# Patient Record
Sex: FEMALE | Race: OTHER | HISPANIC OR LATINO | URBAN - METROPOLITAN AREA
[De-identification: names, ages, dates, MRNs, and addresses within clinical notes are randomized per-mention and may not be internally consistent; named-entity substitution may affect disease eponyms.]

---

## 2021-06-09 ENCOUNTER — EMERGENCY (EMERGENCY)
Facility: HOSPITAL | Age: 41
LOS: 1 days | Discharge: HOME | End: 2021-06-09
Attending: EMERGENCY MEDICINE
Payer: MEDICAID

## 2021-06-09 VITALS
DIASTOLIC BLOOD PRESSURE: 88 MMHG | WEIGHT: 186.95 LBS | HEIGHT: 62 IN | SYSTOLIC BLOOD PRESSURE: 136 MMHG | RESPIRATION RATE: 16 BRPM | HEART RATE: 92 BPM | TEMPERATURE: 97 F | OXYGEN SATURATION: 99 %

## 2021-06-09 VITALS
HEART RATE: 80 BPM | SYSTOLIC BLOOD PRESSURE: 122 MMHG | TEMPERATURE: 98 F | DIASTOLIC BLOOD PRESSURE: 77 MMHG | RESPIRATION RATE: 17 BRPM | OXYGEN SATURATION: 99 %

## 2021-06-09 LAB
ALBUMIN SERPL ELPH-MCNC: 3.5 G/DL — SIGNIFICANT CHANGE UP (ref 3.5–5)
ALP SERPL-CCNC: 71 U/L — SIGNIFICANT CHANGE UP (ref 40–120)
ALT FLD-CCNC: 18 U/L DA — SIGNIFICANT CHANGE UP (ref 10–60)
ANION GAP SERPL CALC-SCNC: 5 MMOL/L — SIGNIFICANT CHANGE UP (ref 5–17)
APPEARANCE UR: CLEAR — SIGNIFICANT CHANGE UP
AST SERPL-CCNC: 14 U/L — SIGNIFICANT CHANGE UP (ref 10–40)
BASOPHILS # BLD AUTO: 0.04 K/UL — SIGNIFICANT CHANGE UP (ref 0–0.2)
BASOPHILS NFR BLD AUTO: 0.3 % — SIGNIFICANT CHANGE UP (ref 0–2)
BILIRUB SERPL-MCNC: 0.3 MG/DL — SIGNIFICANT CHANGE UP (ref 0.2–1.2)
BILIRUB UR-MCNC: NEGATIVE — SIGNIFICANT CHANGE UP
BUN SERPL-MCNC: 13 MG/DL — SIGNIFICANT CHANGE UP (ref 7–18)
CALCIUM SERPL-MCNC: 8.9 MG/DL — SIGNIFICANT CHANGE UP (ref 8.4–10.5)
CHLORIDE SERPL-SCNC: 108 MMOL/L — SIGNIFICANT CHANGE UP (ref 96–108)
CO2 SERPL-SCNC: 26 MMOL/L — SIGNIFICANT CHANGE UP (ref 22–31)
COLOR SPEC: YELLOW — SIGNIFICANT CHANGE UP
CREAT SERPL-MCNC: 0.66 MG/DL — SIGNIFICANT CHANGE UP (ref 0.5–1.3)
DIFF PNL FLD: ABNORMAL
EOSINOPHIL # BLD AUTO: 0.2 K/UL — SIGNIFICANT CHANGE UP (ref 0–0.5)
EOSINOPHIL NFR BLD AUTO: 1.5 % — SIGNIFICANT CHANGE UP (ref 0–6)
GLUCOSE SERPL-MCNC: 86 MG/DL — SIGNIFICANT CHANGE UP (ref 70–99)
GLUCOSE UR QL: NEGATIVE — SIGNIFICANT CHANGE UP
HCG SERPL-ACNC: <1 MIU/ML — SIGNIFICANT CHANGE UP
HCT VFR BLD CALC: 42.6 % — SIGNIFICANT CHANGE UP (ref 34.5–45)
HGB BLD-MCNC: 13.7 G/DL — SIGNIFICANT CHANGE UP (ref 11.5–15.5)
IMM GRANULOCYTES NFR BLD AUTO: 0.5 % — SIGNIFICANT CHANGE UP (ref 0–1.5)
KETONES UR-MCNC: NEGATIVE — SIGNIFICANT CHANGE UP
LEUKOCYTE ESTERASE UR-ACNC: ABNORMAL
LIDOCAIN IGE QN: 130 U/L — SIGNIFICANT CHANGE UP (ref 73–393)
LYMPHOCYTES # BLD AUTO: 24.1 % — SIGNIFICANT CHANGE UP (ref 13–44)
LYMPHOCYTES # BLD AUTO: 3.19 K/UL — SIGNIFICANT CHANGE UP (ref 1–3.3)
MCHC RBC-ENTMCNC: 28.1 PG — SIGNIFICANT CHANGE UP (ref 27–34)
MCHC RBC-ENTMCNC: 32.2 GM/DL — SIGNIFICANT CHANGE UP (ref 32–36)
MCV RBC AUTO: 87.5 FL — SIGNIFICANT CHANGE UP (ref 80–100)
MONOCYTES # BLD AUTO: 0.81 K/UL — SIGNIFICANT CHANGE UP (ref 0–0.9)
MONOCYTES NFR BLD AUTO: 6.1 % — SIGNIFICANT CHANGE UP (ref 2–14)
NEUTROPHILS # BLD AUTO: 8.93 K/UL — HIGH (ref 1.8–7.4)
NEUTROPHILS NFR BLD AUTO: 67.5 % — SIGNIFICANT CHANGE UP (ref 43–77)
NITRITE UR-MCNC: NEGATIVE — SIGNIFICANT CHANGE UP
NRBC # BLD: 0 /100 WBCS — SIGNIFICANT CHANGE UP (ref 0–0)
PH UR: 7 — SIGNIFICANT CHANGE UP (ref 5–8)
PLATELET # BLD AUTO: 261 K/UL — SIGNIFICANT CHANGE UP (ref 150–400)
POTASSIUM SERPL-MCNC: 3.9 MMOL/L — SIGNIFICANT CHANGE UP (ref 3.5–5.3)
POTASSIUM SERPL-SCNC: 3.9 MMOL/L — SIGNIFICANT CHANGE UP (ref 3.5–5.3)
PROT SERPL-MCNC: 7.8 G/DL — SIGNIFICANT CHANGE UP (ref 6–8.3)
PROT UR-MCNC: NEGATIVE — SIGNIFICANT CHANGE UP
RBC # BLD: 4.87 M/UL — SIGNIFICANT CHANGE UP (ref 3.8–5.2)
RBC # FLD: 13.9 % — SIGNIFICANT CHANGE UP (ref 10.3–14.5)
SODIUM SERPL-SCNC: 139 MMOL/L — SIGNIFICANT CHANGE UP (ref 135–145)
SP GR SPEC: 1 — LOW (ref 1.01–1.02)
UROBILINOGEN FLD QL: NEGATIVE — SIGNIFICANT CHANGE UP
WBC # BLD: 13.24 K/UL — HIGH (ref 3.8–10.5)
WBC # FLD AUTO: 13.24 K/UL — HIGH (ref 3.8–10.5)

## 2021-06-09 PROCEDURE — 85025 COMPLETE CBC W/AUTO DIFF WBC: CPT

## 2021-06-09 PROCEDURE — 83690 ASSAY OF LIPASE: CPT

## 2021-06-09 PROCEDURE — 87086 URINE CULTURE/COLONY COUNT: CPT

## 2021-06-09 PROCEDURE — 81001 URINALYSIS AUTO W/SCOPE: CPT

## 2021-06-09 PROCEDURE — 93005 ELECTROCARDIOGRAM TRACING: CPT

## 2021-06-09 PROCEDURE — 84702 CHORIONIC GONADOTROPIN TEST: CPT

## 2021-06-09 PROCEDURE — 36415 COLL VENOUS BLD VENIPUNCTURE: CPT

## 2021-06-09 PROCEDURE — 99284 EMERGENCY DEPT VISIT MOD MDM: CPT | Mod: 25

## 2021-06-09 PROCEDURE — 96374 THER/PROPH/DIAG INJ IV PUSH: CPT

## 2021-06-09 PROCEDURE — 99284 EMERGENCY DEPT VISIT MOD MDM: CPT

## 2021-06-09 PROCEDURE — 80053 COMPREHEN METABOLIC PANEL: CPT

## 2021-06-09 RX ORDER — SODIUM CHLORIDE 9 MG/ML
1000 INJECTION, SOLUTION INTRAVENOUS ONCE
Refills: 0 | Status: COMPLETED | OUTPATIENT
Start: 2021-06-09 | End: 2021-06-09

## 2021-06-09 RX ORDER — AZTREONAM 2 G
1 VIAL (EA) INJECTION
Qty: 14 | Refills: 0
Start: 2021-06-09 | End: 2021-06-15

## 2021-06-09 RX ORDER — CEFPODOXIME PROXETIL 100 MG
200 TABLET ORAL ONCE
Refills: 0 | Status: DISCONTINUED | OUTPATIENT
Start: 2021-06-09 | End: 2021-06-09

## 2021-06-09 RX ORDER — CEFDINIR 250 MG/5ML
1 POWDER, FOR SUSPENSION ORAL
Qty: 14 | Refills: 0
Start: 2021-06-09 | End: 2021-06-15

## 2021-06-09 RX ORDER — ONDANSETRON 8 MG/1
4 TABLET, FILM COATED ORAL ONCE
Refills: 0 | Status: COMPLETED | OUTPATIENT
Start: 2021-06-09 | End: 2021-06-09

## 2021-06-09 RX ADMIN — ONDANSETRON 4 MILLIGRAM(S): 8 TABLET, FILM COATED ORAL at 18:06

## 2021-06-09 RX ADMIN — SODIUM CHLORIDE 1000 MILLILITER(S): 9 INJECTION, SOLUTION INTRAVENOUS at 18:06

## 2021-06-09 RX ADMIN — Medication 1 TABLET(S): at 19:47

## 2021-06-09 NOTE — ED PROVIDER NOTE - OBJECTIVE STATEMENT
40 y.o female with a PMHx of gastric sleeve x2 months ago, covid -19 in January 2021 and HTN  presents to the ED c.o vomiting since the gastric sleeve was placed and some dizziness. Patient states she hasn't been taking her HTN medications for x3 days. Patient was having dysuria, urgency ,frequency and took OTC medications for it but doesn't remember what it was. Patient  endorses she goes from laying down to standing is when she feels the dizziness. Patient denies any weakness to arm sor legs, double vision , trouble swallowing, chest pain, abdominal pain or any other acute complaints. Allergies: Penicillin

## 2021-06-09 NOTE — ED PROVIDER NOTE - PATIENT PORTAL LINK FT
You can access the FollowMyHealth Patient Portal offered by [facility] by registering at the following website: [email or web address]. By joining [employer]’s FollowMyHealth portal, you will also be able to view your health information using other applications (apps) compatible with our system.

## 2021-06-09 NOTE — ED PROVIDER NOTE - PROGRESS NOTE DETAILS
pres wo abdominal pain, brigitte po fluid here. steady gait and wo any focal deficits and no indication for ctap / ct head right now. will give po abx for probable cystitis (urine on ua is quite dilute and pt not on period). feeling better and wants to go home, reviewed case and results w pt, questions answered and pt understands, advised return precautions and care plan.

## 2021-06-09 NOTE — ED PROVIDER NOTE - NSFOLLOWUPINSTRUCTIONS_ED_ALL_ED_FT
IMPORTANT INSTRUCTIONS FROM Dr. WOLFE:    Please follow up with your personal medical doctor in 24-48 hours. A definitive diagnosis has not been made and you will need follow up.  Bring results from today to your visit.    Antibiotics as prescribed.  If you were advised to take any medications - be sure to review the package insert.    If your symptoms change, get worse or if you have any new symptoms, come to the ER right away.  If you have any questions, call the ER at the phone number on this page.

## 2021-06-09 NOTE — ED PROVIDER NOTE - CLINICAL SUMMARY MEDICAL DECISION MAKING FREE TEXT BOX
Please add to Epic and Outlook8:30 -10:00 Friday, March 1Day 3 - Evaluation (finish OWLS)
Likely has early pyelonephritis, however, possible electrolyte imbalance, related to vomiting from gastric sleeve. Given benign exam and chief complaint, not going to the ct of abdomen or ct of head until findings from blood work and urine . Patient unlikely stroke, given history, so code stroke not called

## 2021-06-10 LAB
CULTURE RESULTS: SIGNIFICANT CHANGE UP
SPECIMEN SOURCE: SIGNIFICANT CHANGE UP

## 2021-12-07 ENCOUNTER — TRANSCRIPTION ENCOUNTER (OUTPATIENT)
Age: 41
End: 2021-12-07

## 2021-12-07 ENCOUNTER — INPATIENT (INPATIENT)
Facility: HOSPITAL | Age: 41
LOS: 2 days | Discharge: HOME | End: 2021-12-10
Attending: SURGERY | Admitting: SURGERY
Payer: MEDICAID

## 2021-12-07 VITALS
OXYGEN SATURATION: 100 % | SYSTOLIC BLOOD PRESSURE: 145 MMHG | HEART RATE: 63 BPM | DIASTOLIC BLOOD PRESSURE: 89 MMHG | TEMPERATURE: 98 F | RESPIRATION RATE: 18 BRPM | HEIGHT: 62 IN

## 2021-12-07 PROCEDURE — 99285 EMERGENCY DEPT VISIT HI MDM: CPT

## 2021-12-07 NOTE — ED ADULT TRIAGE NOTE - CHIEF COMPLAINT QUOTE
c/o upper abdominal pain since this morning, started after eating fried cheese. states vomited once. denies nausea at present. last bm this morning. hx gastric sleeve sx in March 2021.

## 2021-12-08 ENCOUNTER — RESULT REVIEW (OUTPATIENT)
Age: 41
End: 2021-12-08

## 2021-12-08 DIAGNOSIS — K81.0 ACUTE CHOLECYSTITIS: ICD-10-CM

## 2021-12-08 DIAGNOSIS — Z90.3 ACQUIRED ABSENCE OF STOMACH [PART OF]: Chronic | ICD-10-CM

## 2021-12-08 LAB
ALBUMIN SERPL ELPH-MCNC: 4.6 G/DL — SIGNIFICANT CHANGE UP (ref 3.3–5)
ALP SERPL-CCNC: 82 U/L — SIGNIFICANT CHANGE UP (ref 40–120)
ALT FLD-CCNC: 8 U/L — SIGNIFICANT CHANGE UP (ref 4–33)
ANION GAP SERPL CALC-SCNC: 10 MMOL/L — SIGNIFICANT CHANGE UP (ref 7–14)
APTT BLD: 30.7 SEC — SIGNIFICANT CHANGE UP (ref 27–36.3)
AST SERPL-CCNC: 12 U/L — SIGNIFICANT CHANGE UP (ref 4–32)
B PERT DNA SPEC QL NAA+PROBE: SIGNIFICANT CHANGE UP
B PERT+PARAPERT DNA PNL SPEC NAA+PROBE: SIGNIFICANT CHANGE UP
BASOPHILS # BLD AUTO: 0.03 K/UL — SIGNIFICANT CHANGE UP (ref 0–0.2)
BASOPHILS NFR BLD AUTO: 0.2 % — SIGNIFICANT CHANGE UP (ref 0–2)
BILIRUB SERPL-MCNC: 0.3 MG/DL — SIGNIFICANT CHANGE UP (ref 0.2–1.2)
BLD GP AB SCN SERPL QL: NEGATIVE — SIGNIFICANT CHANGE UP
BORDETELLA PARAPERTUSSIS (RAPRVP): SIGNIFICANT CHANGE UP
BUN SERPL-MCNC: 19 MG/DL — SIGNIFICANT CHANGE UP (ref 7–23)
C PNEUM DNA SPEC QL NAA+PROBE: SIGNIFICANT CHANGE UP
CALCIUM SERPL-MCNC: 10 MG/DL — SIGNIFICANT CHANGE UP (ref 8.4–10.5)
CHLORIDE SERPL-SCNC: 100 MMOL/L — SIGNIFICANT CHANGE UP (ref 98–107)
CO2 SERPL-SCNC: 27 MMOL/L — SIGNIFICANT CHANGE UP (ref 22–31)
CREAT SERPL-MCNC: 0.66 MG/DL — SIGNIFICANT CHANGE UP (ref 0.5–1.3)
EOSINOPHIL # BLD AUTO: 0.01 K/UL — SIGNIFICANT CHANGE UP (ref 0–0.5)
EOSINOPHIL NFR BLD AUTO: 0.1 % — SIGNIFICANT CHANGE UP (ref 0–6)
FLUAV SUBTYP SPEC NAA+PROBE: SIGNIFICANT CHANGE UP
FLUBV RNA SPEC QL NAA+PROBE: SIGNIFICANT CHANGE UP
GLUCOSE SERPL-MCNC: 101 MG/DL — HIGH (ref 70–99)
HADV DNA SPEC QL NAA+PROBE: SIGNIFICANT CHANGE UP
HCG SERPL-ACNC: <5 MIU/ML — SIGNIFICANT CHANGE UP
HCOV 229E RNA SPEC QL NAA+PROBE: SIGNIFICANT CHANGE UP
HCOV HKU1 RNA SPEC QL NAA+PROBE: SIGNIFICANT CHANGE UP
HCOV NL63 RNA SPEC QL NAA+PROBE: SIGNIFICANT CHANGE UP
HCOV OC43 RNA SPEC QL NAA+PROBE: SIGNIFICANT CHANGE UP
HCT VFR BLD CALC: 43.6 % — SIGNIFICANT CHANGE UP (ref 34.5–45)
HGB BLD-MCNC: 13.7 G/DL — SIGNIFICANT CHANGE UP (ref 11.5–15.5)
HMPV RNA SPEC QL NAA+PROBE: SIGNIFICANT CHANGE UP
HPIV1 RNA SPEC QL NAA+PROBE: SIGNIFICANT CHANGE UP
HPIV2 RNA SPEC QL NAA+PROBE: SIGNIFICANT CHANGE UP
HPIV3 RNA SPEC QL NAA+PROBE: SIGNIFICANT CHANGE UP
HPIV4 RNA SPEC QL NAA+PROBE: SIGNIFICANT CHANGE UP
IANC: 11.55 K/UL — HIGH (ref 1.5–8.5)
IMM GRANULOCYTES NFR BLD AUTO: 0.4 % — SIGNIFICANT CHANGE UP (ref 0–1.5)
INR BLD: 1.19 RATIO — HIGH (ref 0.88–1.16)
LIDOCAIN IGE QN: 24 U/L — SIGNIFICANT CHANGE UP (ref 7–60)
LYMPHOCYTES # BLD AUTO: 1.83 K/UL — SIGNIFICANT CHANGE UP (ref 1–3.3)
LYMPHOCYTES # BLD AUTO: 13 % — SIGNIFICANT CHANGE UP (ref 13–44)
M PNEUMO DNA SPEC QL NAA+PROBE: SIGNIFICANT CHANGE UP
MCHC RBC-ENTMCNC: 27 PG — SIGNIFICANT CHANGE UP (ref 27–34)
MCHC RBC-ENTMCNC: 31.4 GM/DL — LOW (ref 32–36)
MCV RBC AUTO: 86 FL — SIGNIFICANT CHANGE UP (ref 80–100)
MONOCYTES # BLD AUTO: 0.57 K/UL — SIGNIFICANT CHANGE UP (ref 0–0.9)
MONOCYTES NFR BLD AUTO: 4.1 % — SIGNIFICANT CHANGE UP (ref 2–14)
NEUTROPHILS # BLD AUTO: 11.55 K/UL — HIGH (ref 1.8–7.4)
NEUTROPHILS NFR BLD AUTO: 82.2 % — HIGH (ref 43–77)
NRBC # BLD: 0 /100 WBCS — SIGNIFICANT CHANGE UP
NRBC # FLD: 0 K/UL — SIGNIFICANT CHANGE UP
PLATELET # BLD AUTO: 309 K/UL — SIGNIFICANT CHANGE UP (ref 150–400)
POTASSIUM SERPL-MCNC: 3.9 MMOL/L — SIGNIFICANT CHANGE UP (ref 3.5–5.3)
POTASSIUM SERPL-SCNC: 3.9 MMOL/L — SIGNIFICANT CHANGE UP (ref 3.5–5.3)
PROT SERPL-MCNC: 7.9 G/DL — SIGNIFICANT CHANGE UP (ref 6–8.3)
PROTHROM AB SERPL-ACNC: 13.5 SEC — SIGNIFICANT CHANGE UP (ref 10.6–13.6)
RAPID RVP RESULT: DETECTED
RBC # BLD: 5.07 M/UL — SIGNIFICANT CHANGE UP (ref 3.8–5.2)
RBC # FLD: 13.9 % — SIGNIFICANT CHANGE UP (ref 10.3–14.5)
RH IG SCN BLD-IMP: POSITIVE — SIGNIFICANT CHANGE UP
RH IG SCN BLD-IMP: POSITIVE — SIGNIFICANT CHANGE UP
RSV RNA SPEC QL NAA+PROBE: SIGNIFICANT CHANGE UP
RV+EV RNA SPEC QL NAA+PROBE: DETECTED
SARS-COV-2 RNA SPEC QL NAA+PROBE: SIGNIFICANT CHANGE UP
SODIUM SERPL-SCNC: 137 MMOL/L — SIGNIFICANT CHANGE UP (ref 135–145)
WBC # BLD: 14.04 K/UL — HIGH (ref 3.8–10.5)
WBC # FLD AUTO: 14.04 K/UL — HIGH (ref 3.8–10.5)

## 2021-12-08 PROCEDURE — 76705 ECHO EXAM OF ABDOMEN: CPT | Mod: 26,76

## 2021-12-08 PROCEDURE — 47562 LAPAROSCOPIC CHOLECYSTECTOMY: CPT

## 2021-12-08 PROCEDURE — 88304 TISSUE EXAM BY PATHOLOGIST: CPT | Mod: 26

## 2021-12-08 RX ORDER — ACETAMINOPHEN 500 MG
2 TABLET ORAL
Qty: 0 | Refills: 0 | DISCHARGE
Start: 2021-12-08 | End: 2021-12-24

## 2021-12-08 RX ORDER — SODIUM CHLORIDE 9 MG/ML
1000 INJECTION, SOLUTION INTRAVENOUS
Refills: 0 | Status: DISCONTINUED | OUTPATIENT
Start: 2021-12-08 | End: 2021-12-09

## 2021-12-08 RX ORDER — MORPHINE SULFATE 50 MG/1
4 CAPSULE, EXTENDED RELEASE ORAL ONCE
Refills: 0 | Status: DISCONTINUED | OUTPATIENT
Start: 2021-12-08 | End: 2021-12-08

## 2021-12-08 RX ORDER — FAMOTIDINE 10 MG/ML
20 INJECTION INTRAVENOUS ONCE
Refills: 0 | Status: COMPLETED | OUTPATIENT
Start: 2021-12-08 | End: 2021-12-08

## 2021-12-08 RX ORDER — OXYCODONE HYDROCHLORIDE 5 MG/1
1 TABLET ORAL
Qty: 12 | Refills: 0
Start: 2021-12-08 | End: 2021-12-10

## 2021-12-08 RX ORDER — SODIUM CHLORIDE 9 MG/ML
1000 INJECTION INTRAMUSCULAR; INTRAVENOUS; SUBCUTANEOUS ONCE
Refills: 0 | Status: COMPLETED | OUTPATIENT
Start: 2021-12-08 | End: 2021-12-08

## 2021-12-08 RX ORDER — ENOXAPARIN SODIUM 100 MG/ML
40 INJECTION SUBCUTANEOUS DAILY
Refills: 0 | Status: DISCONTINUED | OUTPATIENT
Start: 2021-12-08 | End: 2021-12-10

## 2021-12-08 RX ORDER — OXYCODONE HYDROCHLORIDE 5 MG/1
5 TABLET ORAL ONCE
Refills: 0 | Status: DISCONTINUED | OUTPATIENT
Start: 2021-12-08 | End: 2021-12-08

## 2021-12-08 RX ORDER — ACETAMINOPHEN 500 MG
1000 TABLET ORAL EVERY 6 HOURS
Refills: 0 | Status: DISCONTINUED | OUTPATIENT
Start: 2021-12-08 | End: 2021-12-09

## 2021-12-08 RX ORDER — HYDROMORPHONE HYDROCHLORIDE 2 MG/ML
1 INJECTION INTRAMUSCULAR; INTRAVENOUS; SUBCUTANEOUS
Refills: 0 | Status: DISCONTINUED | OUTPATIENT
Start: 2021-12-08 | End: 2021-12-09

## 2021-12-08 RX ORDER — ERTAPENEM SODIUM 1 G/1
1000 INJECTION, POWDER, LYOPHILIZED, FOR SOLUTION INTRAMUSCULAR; INTRAVENOUS EVERY 24 HOURS
Refills: 0 | Status: DISCONTINUED | OUTPATIENT
Start: 2021-12-08 | End: 2021-12-09

## 2021-12-08 RX ORDER — MORPHINE SULFATE 50 MG/1
2 CAPSULE, EXTENDED RELEASE ORAL EVERY 4 HOURS
Refills: 0 | Status: DISCONTINUED | OUTPATIENT
Start: 2021-12-08 | End: 2021-12-09

## 2021-12-08 RX ORDER — MORPHINE SULFATE 50 MG/1
2 CAPSULE, EXTENDED RELEASE ORAL ONCE
Refills: 0 | Status: DISCONTINUED | OUTPATIENT
Start: 2021-12-08 | End: 2021-12-08

## 2021-12-08 RX ORDER — FENTANYL CITRATE 50 UG/ML
25 INJECTION INTRAVENOUS
Refills: 0 | Status: DISCONTINUED | OUTPATIENT
Start: 2021-12-08 | End: 2021-12-09

## 2021-12-08 RX ORDER — ONDANSETRON 8 MG/1
4 TABLET, FILM COATED ORAL ONCE
Refills: 0 | Status: DISCONTINUED | OUTPATIENT
Start: 2021-12-08 | End: 2021-12-09

## 2021-12-08 RX ORDER — ACETAMINOPHEN 500 MG
2 TABLET ORAL
Qty: 0 | Refills: 0 | DISCHARGE
Start: 2021-12-08

## 2021-12-08 RX ORDER — ONDANSETRON 8 MG/1
4 TABLET, FILM COATED ORAL ONCE
Refills: 0 | Status: COMPLETED | OUTPATIENT
Start: 2021-12-08 | End: 2021-12-08

## 2021-12-08 RX ADMIN — OXYCODONE HYDROCHLORIDE 5 MILLIGRAM(S): 5 TABLET ORAL at 23:26

## 2021-12-08 RX ADMIN — Medication 30 MILLILITER(S): at 00:55

## 2021-12-08 RX ADMIN — MORPHINE SULFATE 2 MILLIGRAM(S): 50 CAPSULE, EXTENDED RELEASE ORAL at 12:27

## 2021-12-08 RX ADMIN — Medication 1000 MILLIGRAM(S): at 16:37

## 2021-12-08 RX ADMIN — SODIUM CHLORIDE 1000 MILLILITER(S): 9 INJECTION INTRAMUSCULAR; INTRAVENOUS; SUBCUTANEOUS at 00:55

## 2021-12-08 RX ADMIN — HYDROMORPHONE HYDROCHLORIDE 1 MILLIGRAM(S): 2 INJECTION INTRAMUSCULAR; INTRAVENOUS; SUBCUTANEOUS at 22:52

## 2021-12-08 RX ADMIN — FAMOTIDINE 20 MILLIGRAM(S): 10 INJECTION INTRAVENOUS at 00:55

## 2021-12-08 RX ADMIN — ERTAPENEM SODIUM 120 MILLIGRAM(S): 1 INJECTION, POWDER, LYOPHILIZED, FOR SOLUTION INTRAMUSCULAR; INTRAVENOUS at 06:33

## 2021-12-08 RX ADMIN — ENOXAPARIN SODIUM 40 MILLIGRAM(S): 100 INJECTION SUBCUTANEOUS at 11:50

## 2021-12-08 RX ADMIN — MORPHINE SULFATE 2 MILLIGRAM(S): 50 CAPSULE, EXTENDED RELEASE ORAL at 11:51

## 2021-12-08 RX ADMIN — MORPHINE SULFATE 4 MILLIGRAM(S): 50 CAPSULE, EXTENDED RELEASE ORAL at 05:00

## 2021-12-08 RX ADMIN — Medication 1000 MILLIGRAM(S): at 23:26

## 2021-12-08 RX ADMIN — HYDROMORPHONE HYDROCHLORIDE 1 MILLIGRAM(S): 2 INJECTION INTRAMUSCULAR; INTRAVENOUS; SUBCUTANEOUS at 23:42

## 2021-12-08 RX ADMIN — SODIUM CHLORIDE 125 MILLILITER(S): 9 INJECTION, SOLUTION INTRAVENOUS at 19:27

## 2021-12-08 RX ADMIN — SODIUM CHLORIDE 125 MILLILITER(S): 9 INJECTION, SOLUTION INTRAVENOUS at 05:50

## 2021-12-08 RX ADMIN — ONDANSETRON 4 MILLIGRAM(S): 8 TABLET, FILM COATED ORAL at 00:55

## 2021-12-08 RX ADMIN — MORPHINE SULFATE 4 MILLIGRAM(S): 50 CAPSULE, EXTENDED RELEASE ORAL at 04:29

## 2021-12-08 NOTE — BRIEF OPERATIVE NOTE - OPERATION/FINDINGS
Laparoscopic cholecystectomy for acute cholecystitis     Distended and inflamed gallbladder, decompressed with endoscopic needle with return of bile. After achieving the critical view, the duct and artery are clipped with Hemolocks x 3 and divided; hemostatic without bile spillage

## 2021-12-08 NOTE — H&P ADULT - NSHPLABSRESULTS_GEN_ALL_CORE
T(C): 36.9 (12-07-21 @ 22:34), Max: 36.9 (12-07-21 @ 22:34)  HR: 63 (12-07-21 @ 22:34) (63 - 63)  BP: 145/89 (12-07-21 @ 22:34) (145/89 - 145/89)  RR: 18 (12-07-21 @ 22:34) (18 - 18)  SpO2: 100% (12-07-21 @ 22:34) (100% - 100%)    LABS:                        13.7   14.04 )-----------( 309      ( 08 Dec 2021 02:17 )             43.6     08 Dec 2021 02:17    137    |  100    |  19     ----------------------------<  101    3.9     |  27     |  0.66     Ca    10.0       08 Dec 2021 02:17    TPro  7.9    /  Alb  4.6    /  TBili  0.3    /  DBili  x      /  AST  12     /  ALT  8      /  AlkPhos  82     08 Dec 2021 02:17

## 2021-12-08 NOTE — ASU DISCHARGE PLAN (ADULT/PEDIATRIC) - NS MD DC FALL RISK RISK
For information on Fall & Injury Prevention, visit: [email or web address] OR  [email or web address] OR  https://www.cdc.gov/steadi/patient.html

## 2021-12-08 NOTE — H&P ADULT - HISTORY OF PRESENT ILLNESS
40F w/ hx of gastric sleeve presenting with RUQ pain for one day. The pain began yesterday after eating fried cheese and bananas for breakfast. It got worse throughout the day until it was severe and she vomited several times. She has never had RUQ pain in the past. Denies fevers or chills.    She has no active medical problems and takes no medications. Her gastric sleeve was done in May 2021 at [facility] and her post op course was uneventful.

## 2021-12-08 NOTE — ED PROVIDER NOTE - OBJECTIVE STATEMENT
40F with hx of gastric sleeve p/w epigastric pain x1 day. patient states pain is severe and worse after eating. also notes some pain to ruq. pain is a burning sensation. no cp, vomited x1 today. no fever, dysuria, no sick contacts.

## 2021-12-08 NOTE — ASU DISCHARGE PLAN (ADULT/PEDIATRIC) - CARE PROVIDER_API CALL
Reinaldo Denton)  Surgery  [address] [place] [address]  Phone: [phone]  Fax: [phone]  Follow Up Time: 1 week

## 2021-12-08 NOTE — ED PROVIDER NOTE - PROGRESS NOTE DETAILS
PA Smartt: US demonstrated stone with no sonographic signs of acute otis however on exam patient with RUQ tenderness. patient was evaluated by surgery, accepted to their service for cholecystectomy

## 2021-12-08 NOTE — H&P ADULT - ASSESSMENT
40F w/ hx of gastric sleeve presenting with acute cholecystitis    - Admit to surgery  - NPO, IV fluids, abx  - Added on for lap otis today 12/8  - Consent in chart  - Discussed with Dr. Corrie SHARP team surgery  Pager [phone]

## 2021-12-08 NOTE — H&P ADULT - NSRESEARCHGRNTASSESS_GEN_ALL_CORE FT
"              After Visit Summary   1/24/2017    Hermelinda Murray    MRN: [id number]           Patient Information     Date Of Birth          1945        Visit Information        Provider Department      1/24/2017 1:45 PM BI Tian MD [facility]        Care Instructions    Try adult aspirin        Follow-ups after your visit        Your next 10 appointments already scheduled     Jan 26, 2017  7:00 AM   Radiology with [facility] INJECT   [facility] ([facility] )    [address] [place] [address] [id number] [phone]              Who to contact     If you have questions or need follow up information about today's clinic visit or your schedule please contact [facility] directly at [phone].  Normal or non-critical lab and imaging results will be communicated to you by MyChart, letter or phone within 4 business days after the clinic has received the results. If you do not hear from us within 7 days, please contact the clinic through MyChart or phone. If you have a critical or abnormal lab result, we will notify you by phone as soon as possible.  Submit refill requests through [employer] or call your pharmacy and they will forward the refill request to us. Please allow 3 business days for your refill to be completed.          Additional Information About Your Visit        [employer] Information     [employer] lets you send messages to your doctor, view your test results, renew your prescriptions, schedule appointments and more. To sign up, go to [email or web address] . Click on \"Log in\" on the left side of the screen, which will take you to the Welcome page. Then click on \"Sign up Now\" on the right side of the page.     You will be asked to enter the access code listed below, as well as some personal information. Please follow the directions to create your username and password.     Your access code is: [email or web address]  Expires: 4/19/2017  1:19 PM     Your " "access code will  in 90 days. If you need help or a new code, please call your [place] clinic or [phone].        Care EveryWhere ID     This is your Care EveryWhere ID. This could be used by other organizations to access your [facility] medical records  [id number]        Your Vitals Were     Pulse Temperature Height Pulse Oximetry          95 97.9  F (36.6  C) (Tympanic) 5' 5\" (1.651 m) 96%         Blood Pressure from Last 3 Encounters:   17 124/62   17 133/80   16 126/78    Weight from Last 3 Encounters:   17 254 lb 10.1 oz (115.5 kg)   16 235 lb (106.595 kg)   16 245 lb (111.131 kg)              Today, you had the following     No orders found for display       Primary Care Provider Office Phone # Fax #    R Hammad Tian [phone]       [facility] [place] [address] [place] [address]        Thank you!     Thank you for choosing [facility] [place]  for your care. Our goal is always to provide you with excellent care. Hearing back from our patients is one way we can continue to improve our services. Please take a few minutes to complete the written survey that you may receive in the mail after your visit with us. Thank you!             Your Updated Medication List - Protect others around you: Learn how to safely use, store and throw away your medicines at www.disposemymeds.org.          This list is accurate as of: 17  1:59 PM.  Always use your most recent med list.                   Brand Name Dispense Instructions for use    acetaminophen 325 MG tablet    TYLENOL    100 tablet    Take 1 tablet (325 mg) by mouth every 4 hours as needed for mild pain       Acidophilus/Goat Milk Caps          aspirin 81 MG EC tablet     30 tablet    Take 1 tablet (81 mg) by mouth daily       betamethasone dipropionate 0.05 % ointment    DIPROSONE    45 g    Apply to elbow and cheek as needed for psoriasis flare ups       cetirizine 10 MG " tablet    zyrTEC    10 tablet    Take 1 tablet (10 mg) by mouth daily       fluticasone 50 MCG/ACT spray    FLONASE    1 Bottle    Spray 1-2 sprays into both nostrils daily       MULTIVITAL PO          nitroglycerin 0.4 MG sublingual tablet    NITROSTAT    25 tablet    1 tablet under the tongue every 5 minutes for chest discomfort       sodium chloride 0.65 % nasal spray    OCEAN    1 Bottle    Spray 1 spray into both nostrils daily as needed for congestion       Vitamin B-12 5000 MCG Subl      Twice a week per pt.       VITAMIN C PO      Take 1,000 mg by mouth daily          Not applicable: This is a surgical and/or non-medical patient

## 2021-12-09 ENCOUNTER — TRANSCRIPTION ENCOUNTER (OUTPATIENT)
Age: 41
End: 2021-12-09

## 2021-12-09 LAB
ALBUMIN SERPL ELPH-MCNC: 3.9 G/DL — SIGNIFICANT CHANGE UP (ref 3.3–5)
ALP SERPL-CCNC: 79 U/L — SIGNIFICANT CHANGE UP (ref 40–120)
ALT FLD-CCNC: 31 U/L — SIGNIFICANT CHANGE UP (ref 4–33)
ANION GAP SERPL CALC-SCNC: 13 MMOL/L — SIGNIFICANT CHANGE UP (ref 7–14)
AST SERPL-CCNC: 45 U/L — HIGH (ref 4–32)
BILIRUB SERPL-MCNC: 0.6 MG/DL — SIGNIFICANT CHANGE UP (ref 0.2–1.2)
BUN SERPL-MCNC: 8 MG/DL — SIGNIFICANT CHANGE UP (ref 7–23)
CALCIUM SERPL-MCNC: 9.2 MG/DL — SIGNIFICANT CHANGE UP (ref 8.4–10.5)
CHLORIDE SERPL-SCNC: 100 MMOL/L — SIGNIFICANT CHANGE UP (ref 98–107)
CO2 SERPL-SCNC: 20 MMOL/L — LOW (ref 22–31)
CREAT SERPL-MCNC: 0.54 MG/DL — SIGNIFICANT CHANGE UP (ref 0.5–1.3)
GLUCOSE SERPL-MCNC: 125 MG/DL — HIGH (ref 70–99)
HCT VFR BLD CALC: 41.2 % — SIGNIFICANT CHANGE UP (ref 34.5–45)
HGB BLD-MCNC: 13 G/DL — SIGNIFICANT CHANGE UP (ref 11.5–15.5)
MAGNESIUM SERPL-MCNC: 1.7 MG/DL — SIGNIFICANT CHANGE UP (ref 1.6–2.6)
MCHC RBC-ENTMCNC: 27.5 PG — SIGNIFICANT CHANGE UP (ref 27–34)
MCHC RBC-ENTMCNC: 31.6 GM/DL — LOW (ref 32–36)
MCV RBC AUTO: 87.3 FL — SIGNIFICANT CHANGE UP (ref 80–100)
NRBC # BLD: 0 /100 WBCS — SIGNIFICANT CHANGE UP
NRBC # FLD: 0 K/UL — SIGNIFICANT CHANGE UP
PHOSPHATE SERPL-MCNC: 3.7 MG/DL — SIGNIFICANT CHANGE UP (ref 2.5–4.5)
PLATELET # BLD AUTO: 247 K/UL — SIGNIFICANT CHANGE UP (ref 150–400)
POTASSIUM SERPL-MCNC: 4.2 MMOL/L — SIGNIFICANT CHANGE UP (ref 3.5–5.3)
POTASSIUM SERPL-SCNC: 4.2 MMOL/L — SIGNIFICANT CHANGE UP (ref 3.5–5.3)
PROT SERPL-MCNC: 7.2 G/DL — SIGNIFICANT CHANGE UP (ref 6–8.3)
RBC # BLD: 4.72 M/UL — SIGNIFICANT CHANGE UP (ref 3.8–5.2)
RBC # FLD: 13.3 % — SIGNIFICANT CHANGE UP (ref 10.3–14.5)
SODIUM SERPL-SCNC: 133 MMOL/L — LOW (ref 135–145)
WBC # BLD: 10.24 K/UL — SIGNIFICANT CHANGE UP (ref 3.8–10.5)
WBC # FLD AUTO: 10.24 K/UL — SIGNIFICANT CHANGE UP (ref 3.8–10.5)

## 2021-12-09 RX ORDER — OXYCODONE HYDROCHLORIDE 5 MG/1
5 TABLET ORAL EVERY 4 HOURS
Refills: 0 | Status: DISCONTINUED | OUTPATIENT
Start: 2021-12-09 | End: 2021-12-10

## 2021-12-09 RX ORDER — IBUPROFEN 200 MG
600 TABLET ORAL EVERY 6 HOURS
Refills: 0 | Status: DISCONTINUED | OUTPATIENT
Start: 2021-12-09 | End: 2021-12-10

## 2021-12-09 RX ORDER — ACETAMINOPHEN 500 MG
975 TABLET ORAL EVERY 6 HOURS
Refills: 0 | Status: DISCONTINUED | OUTPATIENT
Start: 2021-12-09 | End: 2021-12-09

## 2021-12-09 RX ORDER — SODIUM CHLORIDE 9 MG/ML
1000 INJECTION, SOLUTION INTRAVENOUS
Refills: 0 | Status: DISCONTINUED | OUTPATIENT
Start: 2021-12-09 | End: 2021-12-10

## 2021-12-09 RX ORDER — OXYCODONE HYDROCHLORIDE 5 MG/1
2.5 TABLET ORAL EVERY 4 HOURS
Refills: 0 | Status: DISCONTINUED | OUTPATIENT
Start: 2021-12-09 | End: 2021-12-10

## 2021-12-09 RX ORDER — SODIUM CHLORIDE 9 MG/ML
1000 INJECTION, SOLUTION INTRAVENOUS
Refills: 0 | Status: DISCONTINUED | OUTPATIENT
Start: 2021-12-09 | End: 2021-12-09

## 2021-12-09 RX ORDER — OXYCODONE HYDROCHLORIDE 5 MG/1
10 TABLET ORAL EVERY 4 HOURS
Refills: 0 | Status: DISCONTINUED | OUTPATIENT
Start: 2021-12-09 | End: 2021-12-10

## 2021-12-09 RX ORDER — THIAMINE MONONITRATE (VIT B1) 100 MG
100 TABLET ORAL DAILY
Refills: 0 | Status: DISCONTINUED | OUTPATIENT
Start: 2021-12-09 | End: 2021-12-10

## 2021-12-09 RX ORDER — HYDROMORPHONE HYDROCHLORIDE 2 MG/ML
0.5 INJECTION INTRAMUSCULAR; INTRAVENOUS; SUBCUTANEOUS EVERY 4 HOURS
Refills: 0 | Status: DISCONTINUED | OUTPATIENT
Start: 2021-12-09 | End: 2021-12-09

## 2021-12-09 RX ORDER — MAGNESIUM SULFATE 500 MG/ML
2 VIAL (ML) INJECTION ONCE
Refills: 0 | Status: COMPLETED | OUTPATIENT
Start: 2021-12-09 | End: 2021-12-09

## 2021-12-09 RX ORDER — IBUPROFEN 200 MG
400 TABLET ORAL EVERY 6 HOURS
Refills: 0 | Status: DISCONTINUED | OUTPATIENT
Start: 2021-12-09 | End: 2021-12-09

## 2021-12-09 RX ORDER — HYDROMORPHONE HYDROCHLORIDE 2 MG/ML
0.5 INJECTION INTRAMUSCULAR; INTRAVENOUS; SUBCUTANEOUS ONCE
Refills: 0 | Status: DISCONTINUED | OUTPATIENT
Start: 2021-12-09 | End: 2021-12-09

## 2021-12-09 RX ORDER — ACETAMINOPHEN 500 MG
975 TABLET ORAL EVERY 6 HOURS
Refills: 0 | Status: DISCONTINUED | OUTPATIENT
Start: 2021-12-09 | End: 2021-12-10

## 2021-12-09 RX ORDER — ACETAMINOPHEN 500 MG
1000 TABLET ORAL EVERY 6 HOURS
Refills: 0 | Status: DISCONTINUED | OUTPATIENT
Start: 2021-12-09 | End: 2021-12-09

## 2021-12-09 RX ORDER — FOLIC ACID 0.8 MG
1 TABLET ORAL DAILY
Refills: 0 | Status: DISCONTINUED | OUTPATIENT
Start: 2021-12-09 | End: 2021-12-10

## 2021-12-09 RX ORDER — PANTOPRAZOLE SODIUM 20 MG/1
40 TABLET, DELAYED RELEASE ORAL AT BEDTIME
Refills: 0 | Status: DISCONTINUED | OUTPATIENT
Start: 2021-12-09 | End: 2021-12-10

## 2021-12-09 RX ADMIN — Medication 100 MILLIGRAM(S): at 11:52

## 2021-12-09 RX ADMIN — HYDROMORPHONE HYDROCHLORIDE 0.5 MILLIGRAM(S): 2 INJECTION INTRAMUSCULAR; INTRAVENOUS; SUBCUTANEOUS at 03:48

## 2021-12-09 RX ADMIN — Medication 1 MILLIGRAM(S): at 13:05

## 2021-12-09 RX ADMIN — Medication 975 MILLIGRAM(S): at 09:46

## 2021-12-09 RX ADMIN — PANTOPRAZOLE SODIUM 40 MILLIGRAM(S): 20 TABLET, DELAYED RELEASE ORAL at 21:46

## 2021-12-09 RX ADMIN — OXYCODONE HYDROCHLORIDE 10 MILLIGRAM(S): 5 TABLET ORAL at 15:40

## 2021-12-09 RX ADMIN — Medication 25 GRAM(S): at 10:31

## 2021-12-09 RX ADMIN — OXYCODONE HYDROCHLORIDE 10 MILLIGRAM(S): 5 TABLET ORAL at 19:37

## 2021-12-09 RX ADMIN — Medication 975 MILLIGRAM(S): at 08:52

## 2021-12-09 RX ADMIN — OXYCODONE HYDROCHLORIDE 10 MILLIGRAM(S): 5 TABLET ORAL at 14:42

## 2021-12-09 RX ADMIN — HYDROMORPHONE HYDROCHLORIDE 0.5 MILLIGRAM(S): 2 INJECTION INTRAMUSCULAR; INTRAVENOUS; SUBCUTANEOUS at 10:34

## 2021-12-09 RX ADMIN — SODIUM CHLORIDE 100 MILLILITER(S): 9 INJECTION, SOLUTION INTRAVENOUS at 14:42

## 2021-12-09 RX ADMIN — HYDROMORPHONE HYDROCHLORIDE 0.5 MILLIGRAM(S): 2 INJECTION INTRAMUSCULAR; INTRAVENOUS; SUBCUTANEOUS at 02:48

## 2021-12-09 RX ADMIN — Medication 975 MILLIGRAM(S): at 14:46

## 2021-12-09 RX ADMIN — HYDROMORPHONE HYDROCHLORIDE 0.5 MILLIGRAM(S): 2 INJECTION INTRAMUSCULAR; INTRAVENOUS; SUBCUTANEOUS at 10:50

## 2021-12-09 RX ADMIN — OXYCODONE HYDROCHLORIDE 10 MILLIGRAM(S): 5 TABLET ORAL at 06:53

## 2021-12-09 RX ADMIN — Medication 600 MILLIGRAM(S): at 11:52

## 2021-12-09 RX ADMIN — Medication 600 MILLIGRAM(S): at 18:24

## 2021-12-09 RX ADMIN — OXYCODONE HYDROCHLORIDE 10 MILLIGRAM(S): 5 TABLET ORAL at 07:53

## 2021-12-09 RX ADMIN — ENOXAPARIN SODIUM 40 MILLIGRAM(S): 100 INJECTION SUBCUTANEOUS at 11:52

## 2021-12-09 RX ADMIN — OXYCODONE HYDROCHLORIDE 10 MILLIGRAM(S): 5 TABLET ORAL at 18:52

## 2021-12-09 RX ADMIN — Medication 975 MILLIGRAM(S): at 21:46

## 2021-12-09 NOTE — DISCHARGE NOTE PROVIDER - CARE PROVIDERS DIRECT ADDRESSES
[email or web address]

## 2021-12-09 NOTE — DISCHARGE NOTE PROVIDER - NSDCQMCOGNITION_NEU_ALL_CORE
Diabetes is unchanged.   Reminded to bring in blood sugar diary at next visit.  Dietary recommendations for ADA diet.  Regular aerobic exercise.  Medication changes per orders.  Diabetes will be reassessed in 6 months.   No difficulties

## 2021-12-09 NOTE — DISCHARGE NOTE PROVIDER - NSDCFUADDINST_GEN_ALL_CORE_FT
Past Medical History:   Diagnosis Date    Asthma     childhood       Past Surgical History:   Procedure Laterality Date    COLONOSCOPY N/A 2/8/2018    Procedure: COLONOSCOPY;  Surgeon: Gallo Ballesteros MD;  Location: [facility];  Service: Endoscopy;  Laterality: N/A;    WISDOM TOOTH EXTRACTION         Current Outpatient Medications   Medication Sig    albuterol (PROVENTIL/VENTOLIN HFA) 90 mcg/actuation inhaler Take 2 puffs 10-15 minutes before exercise    escitalopram oxalate (LEXAPRO) 5 MG Tab TAKE 1 TABLET BY MOUTH EVERY DAY    fluticasone propionate (FLONASE) 50 mcg/actuation nasal spray 1 spray (50 mcg total) by Each Nostril route 2 (two) times daily.    ketoconazole (NIZORAL) 2 % cream AAA bid    ketoconazole (NIZORAL) 2 % shampoo Wash hair with medicated shampoo at least 2x/week - let sit on scalp at least 5 minutes prior to rinsing     No current facility-administered medications for this visit.        Review of patient's allergies indicates:  No Known Allergies    Family History   Problem Relation Age of Onset    Hypertension Mother     Hyperlipidemia Mother     Diabetes Mother     Heart disease Father 73        MI    Cancer Brother     Lung cancer Brother         smoker    No Known Problems Son     Cancer Maternal Aunt     Ovarian cancer Maternal Aunt     No Known Problems Maternal Uncle     No Known Problems Paternal Aunt     No Known Problems Paternal Uncle     Diabetes Maternal Grandmother     Hyperlipidemia Maternal Grandmother     Hypertension Maternal Grandmother        Social History     Socioeconomic History    Marital status:      Spouse name: Not on file    Number of children: Not on file    Years of education: Not on file    Highest education level: Not on file   Occupational History     Employer: "[employer]   Social Needs    Financial resource strain: Not on file    Food insecurity     Worry: Not on file     Inability: Not on file     Transportation needs     Medical: Not on file     Non-medical: Not on file   Tobacco Use    Smoking status: Former Smoker     Packs/day: 0.25     Years: 2.00     Pack years: 0.50     Types: Cigarettes    Smokeless tobacco: Never Used   Substance and Sexual Activity    Alcohol use: Yes     Alcohol/week: 2.0 standard drinks     Types: 2 Glasses of wine per week     Comment: occasional    Drug use: No    Sexual activity: Yes     Partners: Male     Birth control/protection: Partner-Vasectomy   Lifestyle    Physical activity     Days per week: Not on file     Minutes per session: Not on file    Stress: Not at all   Relationships    Social connections     Talks on phone: Not on file     Gets together: Not on file     Attends Zoroastrianism service: Not on file     Active member of club or organization: Not on file     Attends meetings of clubs or organizations: Not on file     Relationship status: Not on file   Other Topics Concern    Are you pregnant or think you may be? Not Asked    Breast-feeding Not Asked   Social History Narrative    Not on file       Chief Complaint:   Chief Complaint   Patient presents with    Right Elbow - Pain       History of present illness: This is a 53-year-old right-hand-dominant female seen for right elbow pain.  Patient plays tennis couple times a week.  Started back in November.  No injury or trauma.  Pain along the lateral elbow.  Pain radiates down the forearm.  Increased pain with lift anything with her arm extended.  Aleve does help.  Pain at night.  Pain up to an 8/10.    Answers for HPI/ROS submitted by the patient on 12/6/2017   Arm pain  unexpected weight change: No  appetite change : No  sleep disturbance: No  IMMUNOCOMPROMISED: No  nervous/ anxious: No  dysphoric mood: No  rash: No  visual disturbance: No  eye redness: No  eye pain: No  ear pain: No  tinnitus: No  hearing loss: No  sinus pressure : No  nosebleeds: No  enviro allergies: No  food allergies: No  cough:  No  shortness of breath: No  sweating: No  dysuria: No  frequency: No  difficulty urinating: No  hematuria: No  painful intercourse: No  chest pain: No  palpitations: No  nausea: No  vomiting: No  diarrhea: No  blood in stool: No  constipation: No  headaches: No  dizziness: No  numbness: No  seizures: No  joint swelling: No  myalgia: No  weakness: No  back pain: No  Pain Chronicity: new  History of trauma: No  Onset: in the past 7 days  Frequency: constantly  Progression since onset: gradually improving  Injury mechanism: lifting  injury location: weight lifting  pain- numeric: 7/10  pain location: right shoulder  pain quality: aching  Radiating Pain: Yes  If your pain is radiating, to what part of the body?: right arm  Aggravating factors: extension  fever: No  inability to bear weight: Yes  itching: No  joint locking: Yes  limited range of motion: Yes  stiffness: No  tingling: Yes  Treatments tried: brace/corset  physical therapy: not tried  Improvement on treatment: no relief      Physical Examination:    Vital Signs:    Vitals:    12/14/20 1539   Resp: 16       Body mass index is 24.23 kg/m².    This a well-developed, well nourished patient in no acute distress.  They are alert and oriented and cooperative to examination.  Pt. walks without an antalgic gait.      Examination of the right elbow shows no signs of rashes or erythema. The patient has no masses, ecchymosis, or effusion. The patient has full range of motion from 0-160°. Patient has full pronation and supination. Patient is nontender along the medial epicondyle and moderately tender over the lateral epicondyle. Nontender over the olecranon process.  Nontender along the course of the UCL. Patient has a negative valgus stress test and milking maneuver. Negative Tinel's sign over the cubital tunnel. 2+ radial pulse. Intact light touch sensation.     X-rays: X-rays of the right elbow is ordered and reviewed which show no pathology.    Assessment::  Right  lateral epicondylitis    Plan:  Reviewed the x-ray with her today.  We talked about treatment options.  She agreed to try cortisone injection today.  I also got her a tennis elbow brace and gave her a lateral epicondylitis exercise guide.    This note was created using voice recognition software that occasionally misinterpreted phrases or words.    Consult note is delivered via Epic messaging service.       LOW FAT

## 2021-12-09 NOTE — DISCHARGE NOTE NURSING/CASE MANAGEMENT/SOCIAL WORK - NSDCPECAREGIVERED_GEN_ALL_CORE
medicine Medline and carenotes for surgical procedure Lap otis, Oxycodone, Ibuprofen, Tylenol, as well as DC Medications and side effects literature for patient reference. Lap otis  oxy ir  ibuprofen

## 2021-12-09 NOTE — DISCHARGE NOTE PROVIDER - NSDCMRMEDTOKEN_GEN_ALL_CORE_FT
acetaminophen 500 mg oral tablet: 2 tab(s) orally every 6 hours, As needed, Mild Pain (1 - 3)  oxyCODONE 5 mg oral tablet: 1 tab(s) orally every 6 hours as needed for severe pain. MDD:4   acetaminophen 500 mg oral tablet: 2 tab(s) orally every 6 hours  ibuprofen 600 mg oral tablet: 1 tab(s) orally every 6 hours  oxyCODONE 5 mg oral tablet: 1 tab(s) orally every 6 hours as needed for severe pain. MDD:4  pantoprazole 40 mg oral delayed release tablet: 1 tab(s) orally once a day

## 2021-12-09 NOTE — PROGRESS NOTE ADULT - SUBJECTIVE AND OBJECTIVE BOX
ANESTHESIA POSTOP CHECK    40y Female POSTOP DAY 1 S/P     Vital Signs Last 24 Hrs  T(C): 36.9 (09 Dec 2021 09:28), Max: 36.9 (09 Dec 2021 05:30)  T(F): 98.5 (09 Dec 2021 09:28), Max: 98.5 (09 Dec 2021 05:30)  HR: 80 (09 Dec 2021 09:28) (59 - 85)  BP: 134/66 (09 Dec 2021 09:28) (124/90 - 155/80)  BP(mean): 91 (09 Dec 2021 00:30) (79 - 100)  RR: 16 (09 Dec 2021 09:28) (13 - 20)  SpO2: 98% (09 Dec 2021 09:28) (96% - 100%)  I&O's Summary    08 Dec 2021 07:01  -  09 Dec 2021 07:00  --------------------------------------------------------  IN: 120 mL / OUT: 400 mL / NET: -280 mL    09 Dec 2021 07:01  -  09 Dec 2021 11:21  --------------------------------------------------------  IN: 0 mL / OUT: 500 mL / NET: -500 mL        [ x] NO APPARENT ANESTHESIA COMPLICATIONS

## 2021-12-09 NOTE — DISCHARGE NOTE PROVIDER - NSDCFUADDAPPT_GEN_ALL_CORE_FT
Please follow-up with your surgeon, Dr. Neville within 7 days following discharge- please call to schedule an appointment.

## 2021-12-09 NOTE — DISCHARGE NOTE PROVIDER - CARE PROVIDER_API CALL
Loy Neville)  Surgery; Surgical Critical Care  [address] [place] [address]  Phone: [phone]  Fax: [phone]  Follow Up Time:

## 2021-12-09 NOTE — DISCHARGE NOTE NURSING/CASE MANAGEMENT/SOCIAL WORK - PATIENT PORTAL LINK FT
You can access the FollowMyHealth Patient Portal offered by [facility] by registering at the following website: [email or web address]. By joining [employer]’s FollowMyHealth portal, you will also be able to view your health information using other applications (apps) compatible with our system.

## 2021-12-09 NOTE — DISCHARGE NOTE NURSING/CASE MANAGEMENT/SOCIAL WORK - NSDCPEFALRISK_GEN_ALL_CORE
For information on Fall & Injury Prevention, visit: [email or web address] OR  [email or web address] OR  https://www.cdc.gov/steadi/patient.html

## 2021-12-09 NOTE — PROGRESS NOTE ADULT - SUBJECTIVE AND OBJECTIVE BOX
B TEAM Surgery Progress Note    Patient is a 40y old  Female who presents with a chief complaint of     INTERVAL EVENTS: No acute events overnight.      SUBJECTIVE: Patient seen and examined at bedside with surgical team, patient without complaints. Denies fever, chills, CP, SOB nausea, vomiting, abdominal pain.      OBJECTIVE:    Vital Signs Last 24 Hrs  T(C): 36.7 (09 Dec 2021 00:00), Max: 36.8 (08 Dec 2021 09:54)  T(F): 98 (09 Dec 2021 00:00), Max: 98.3 (08 Dec 2021 09:54)  HR: 59 (09 Dec 2021 00:30) (59 - 85)  BP: 142/74 (09 Dec 2021 00:30) (111/64 - 155/80)  BP(mean): 91 (09 Dec 2021 00:30) (79 - 100)  RR: 13 (09 Dec 2021 00:30) (13 - 20)  SpO2: 100% (09 Dec 2021 00:30) (96% - 100%)I&O's Detail    08 Dec 2021 07:01  -  09 Dec 2021 00:40  --------------------------------------------------------  IN:    Oral Fluid: 120 mL  Total IN: 120 mL    OUT:    Voided (mL): 400 mL  Total OUT: 400 mL    Total NET: -280 mL      MEDICATIONS  (STANDING):  enoxaparin Injectable 40 milliGRAM(s) SubCutaneous daily  ertapenem  IVPB 1000 milliGRAM(s) IV Intermittent every 24 hours  lactated ringers. 1000 milliLiter(s) (125 mL/Hr) IV Continuous <Continuous>    MEDICATIONS  (PRN):  acetaminophen     Tablet .. 1000 milliGRAM(s) Oral every 6 hours PRN Mild Pain (1 - 3)  fentaNYL    Injectable 25 MICROGram(s) IV Push every 5 minutes PRN Moderate Pain (4 - 6)  HYDROmorphone  Injectable 1 milliGRAM(s) IV Push every 10 minutes PRN Severe Pain (7 - 10)  morphine  - Injectable 2 milliGRAM(s) IV Push every 4 hours PRN Moderate Pain (4 - 6)  ondansetron Injectable 4 milliGRAM(s) IV Push once PRN Nausea and/or Vomiting      PHYSICAL EXAM:      LABS:                        13.7   14.04 )-----------( 309      ( 08 Dec 2021 02:17 )             43.6     12-08    137  |  100  |  19  ----------------------------<  101<H>  3.9   |  27  |  0.66    Ca    10.0      08 Dec 2021 02:17    TPro  7.9  /  Alb  4.6  /  TBili  0.3  /  DBili  x   /  AST  12  /  ALT  8   /  AlkPhos  82  12-08    PT/INR - ( 08 Dec 2021 05:23 )   PT: 13.5 sec;   INR: 1.19 ratio         PTT - ( 08 Dec 2021 05:23 )  PTT:30.7 sec  LIVER FUNCTIONS - ( 08 Dec 2021 02:17 )  Alb: 4.6 g/dL / Pro: 7.9 g/dL / ALK PHOS: 82 U/L / ALT: 8 U/L / AST: 12 U/L / GGT: x             ABO Interpretation: O (12-08-21 @ 06:16)  ABO Interpretation: O (12-08-21 @ 05:41)      IMAGING:     B TEAM Surgery Progress Note      INTERVAL EVENTS: No acute events overnight.      SUBJECTIVE: Patient seen and examined at bedside with surgical team. Patient admits to RUQ tenderness similar to pain onDenies fever, chills, CP, SOB nausea, vomiting, abdominal pain.      OBJECTIVE:    Vital Signs Last 24 Hrs  T(C): 36.7 (09 Dec 2021 00:00), Max: 36.8 (08 Dec 2021 09:54)  T(F): 98 (09 Dec 2021 00:00), Max: 98.3 (08 Dec 2021 09:54)  HR: 59 (09 Dec 2021 00:30) (59 - 85)  BP: 142/74 (09 Dec 2021 00:30) (111/64 - 155/80)  BP(mean): 91 (09 Dec 2021 00:30) (79 - 100)  RR: 13 (09 Dec 2021 00:30) (13 - 20)  SpO2: 100% (09 Dec 2021 00:30) (96% - 100%)I&O's Detail    08 Dec 2021 07:01  -  09 Dec 2021 00:40  --------------------------------------------------------  IN:    Oral Fluid: 120 mL  Total IN: 120 mL    OUT:    Voided (mL): 400 mL  Total OUT: 400 mL    Total NET: -280 mL      MEDICATIONS  (STANDING):  enoxaparin Injectable 40 milliGRAM(s) SubCutaneous daily  ertapenem  IVPB 1000 milliGRAM(s) IV Intermittent every 24 hours  lactated ringers. 1000 milliLiter(s) (125 mL/Hr) IV Continuous <Continuous>    MEDICATIONS  (PRN):  acetaminophen     Tablet .. 1000 milliGRAM(s) Oral every 6 hours PRN Mild Pain (1 - 3)  fentaNYL    Injectable 25 MICROGram(s) IV Push every 5 minutes PRN Moderate Pain (4 - 6)  HYDROmorphone  Injectable 1 milliGRAM(s) IV Push every 10 minutes PRN Severe Pain (7 - 10)  morphine  - Injectable 2 milliGRAM(s) IV Push every 4 hours PRN Moderate Pain (4 - 6)  ondansetron Injectable 4 milliGRAM(s) IV Push once PRN Nausea and/or Vomiting      PHYSICAL EXAM:      LABS:                        13.7   14.04 )-----------( 309      ( 08 Dec 2021 02:17 )             43.6     12-08    137  |  100  |  19  ----------------------------<  101<H>  3.9   |  27  |  0.66    Ca    10.0      08 Dec 2021 02:17    TPro  7.9  /  Alb  4.6  /  TBili  0.3  /  DBili  x   /  AST  12  /  ALT  8   /  AlkPhos  82  12-08    PT/INR - ( 08 Dec 2021 05:23 )   PT: 13.5 sec;   INR: 1.19 ratio         PTT - ( 08 Dec 2021 05:23 )  PTT:30.7 sec  LIVER FUNCTIONS - ( 08 Dec 2021 02:17 )  Alb: 4.6 g/dL / Pro: 7.9 g/dL / ALK PHOS: 82 U/L / ALT: 8 U/L / AST: 12 U/L / GGT: x             ABO Interpretation: O (12-08-21 @ 06:16)  ABO Interpretation: O (12-08-21 @ 05:41)      IMAGING:     B TEAM Surgery Progress Note      INTERVAL EVENTS: No acute events overnight.      SUBJECTIVE: Patient seen and examined at bedside with surgical team. Patient admits to RUQ tenderness similar to pain on admission.   Denies fever/chills, nausea/vomiting.    OBJECTIVE:    Vital Signs Last 24 Hrs  T(C): 36.9 (09 Dec 2021 05:30), Max: 36.9 (09 Dec 2021 05:30)  T(F): 98.5 (09 Dec 2021 05:30), Max: 98.5 (09 Dec 2021 05:30)  HR: 72 (09 Dec 2021 05:30) (59 - 85)  BP: 141/72 (09 Dec 2021 05:30) (111/64 - 155/80)  BP(mean): 91 (09 Dec 2021 00:30) (79 - 100)  RR: 16 (09 Dec 2021 05:30) (13 - 20)  SpO2: 99% (09 Dec 2021 05:30) (96% - 100%)      08 Dec 2021 07:01  -  09 Dec 2021 07:00  --------------------------------------------------------  IN:    Oral Fluid: 120 mL  Total IN: 120 mL    OUT:    Voided (mL): 400 mL  Total OUT: 400 mL    Total NET: -280 mL    MEDICATIONS  (STANDING):  acetaminophen     Tablet .. 975 milliGRAM(s) Oral every 6 hours  enoxaparin Injectable 40 milliGRAM(s) SubCutaneous daily  folic acid Injectable 1 milliGRAM(s) IV Push daily  lactated ringers. 1000 milliLiter(s) (100 mL/Hr) IV Continuous <Continuous>  thiamine Injectable 100 milliGRAM(s) IV Push daily    MEDICATIONS  (PRN):  oxyCODONE    IR 5 milliGRAM(s) Oral every 4 hours PRN Moderate Pain (4 - 6)  oxyCODONE    IR 10 milliGRAM(s) Oral every 4 hours PRN Severe Pain (7 - 10)    Physical Exam:  General: Lying in bed in NAD  Head: NCAT, no visible lesions   Chest: no visible deformity, nonlabored respirations   Abdomen: nondistended, +perincisional and RUQ tenderness, no rebound or guarding      LABS:  cret                        13.0   10.24 )-----------( 247      ( 09 Dec 2021 06:41 )             41.2     12-09    133<L>  |  100  |  8   ----------------------------<  125<H>  4.2   |  20<L>  |  0.54    Ca    9.2      09 Dec 2021 06:41  Phos  3.7     12-09  Mg     1.70     12-09    TPro  7.2  /  Alb  3.9  /  TBili  0.6  /  DBili  x   /  AST  45<H>  /  ALT  31  /  AlkPhos  79  12-09    PT/INR - ( 08 Dec 2021 05:23 )   PT: 13.5 sec;   INR: 1.19 ratio         PTT - ( 08 Dec 2021 05:23 )  PTT:30.7 sec

## 2021-12-09 NOTE — DISCHARGE NOTE NURSING/CASE MANAGEMENT/SOCIAL WORK - NSDPDISTO_GEN_ALL_CORE
Pt  with incisions CDI steri strips to scope sites, Voiding, VS stable Afebrile. pt with positive bowel sounds brigitte po diet./Skilled Nursing Facility Pt  with incisions CDI steri strips to scope sites, Voiding, VS stable Afebrile. pt with positive bowel sounds brigitte po diet./Home

## 2021-12-09 NOTE — DISCHARGE NOTE PROVIDER - NSDCCPCAREPLAN_GEN_ALL_CORE_FT
PRINCIPAL DISCHARGE DIAGNOSIS  Diagnosis: Acute cholecystitis  Assessment and Plan of Treatment: WOUND CARE:  Please keep incisions clean and dry. Please do not Scrub or rub incisions. Do not use lotion or powder on incisions.   BATHING: Please do not submerge wound underwater. You may shower and/or sponge bathe.  ACTIVITY: No heavy lifting or straining. Otherwise, you may return to your usual level of physical activity. If you are taking narcotic pain medication (such as Percocet) DO NOT drive a car, operate machinery or make important decisions.  DIET: Return to your usual diet.  NOTIFY YOUR SURGEON IF: You have any bleeding that does not stop, any pus draining from your wound(s), any fever (over 100.4 F) or chills, persistent nausea/vomiting, persistent diarrhea, or if your pain is not controlled on your discharge pain medications.  FOLLOW-UP: Please follow up with your primary care physician in one week regarding your hospitalization. Please follow-up with your surgeon, within 7 days following discharge- please call to schedule an appointment.

## 2021-12-09 NOTE — DISCHARGE NOTE NURSING/CASE MANAGEMENT/SOCIAL WORK - NSDCPNINST_GEN_ALL_CORE
Maintain abdominal incision/scope sites clean and dry, call MD with any signs of infection such as fever, redness or drainage from site. Continue to drink plenty of fluids, avoid strenuous activity and heavy lifting, as well as constipation which may be a side effect from taking narcotic pain medication. Follow up with surgeon in the office for post-op check as instructed, as well as PMD for continuity of care.

## 2021-12-09 NOTE — PATIENT PROFILE ADULT - FALL HARM RISK - HARM RISK INTERVENTIONS

## 2021-12-09 NOTE — CHART NOTE - NSCHARTNOTEFT_GEN_A_CORE
POST-OPERATIVE NOTE    Subjective:  Patient is s/p Laparoscopic cholecystectomy using multiple ports. Patient was prepped to go home same day after surgery but kept admitted for overnight observation and pain control. Patient examined at admitting floor. Patient reports pain from abdomen. Denies nausea/vomiting. Tolerating PO sips water. Denies CP, SOB, fevers/chills. Voided multiple times since surgery.     Vital Signs Last 24 Hrs  T(C): 36.4 (09 Dec 2021 01:22), Max: 36.8 (08 Dec 2021 09:54)  T(F): 97.6 (09 Dec 2021 01:22), Max: 98.3 (08 Dec 2021 09:54)  HR: 66 (09 Dec 2021 01:22) (59 - 85)  BP: 143/67 (09 Dec 2021 01:22) (111/64 - 155/80)  BP(mean): 91 (09 Dec 2021 00:30) (79 - 100)  RR: 16 (09 Dec 2021 01:22) (13 - 20)  SpO2: 100% (09 Dec 2021 01:22) (96% - 100%)  I&O's Detail    08 Dec 2021 07:01  -  09 Dec 2021 02:37  --------------------------------------------------------  IN:    Oral Fluid: 120 mL  Total IN: 120 mL    OUT:    Voided (mL): 400 mL  Total OUT: 400 mL    Total NET: -280 mL        enoxaparin Injectable 40    PAST MEDICAL & SURGICAL HISTORY:  No pertinent past medical history    H/O gastric sleeve      Physical Exam:   GEN: resting in bed comfortably in NAD  RESP: no increased WOB  ABD: soft, non-distended, appropriately tender around incisions without rebound tenderness or guarding.   EXTR: warm, well-perfused without gross deformities  NEURO: awake, alert     LABS:                        13.7   14.04 )-----------( 309      ( 08 Dec 2021 02:17 )             43.6     12-08    137  |  100  |  19  ----------------------------<  101<H>  3.9   |  27  |  0.66    Ca    10.0      08 Dec 2021 02:17    TPro  7.9  /  Alb  4.6  /  TBili  0.3  /  DBili  x   /  AST  12  /  ALT  8   /  AlkPhos  82  12-08    PT/INR - ( 08 Dec 2021 05:23 )   PT: 13.5 sec;   INR: 1.19 ratio         PTT - ( 08 Dec 2021 05:23 )  PTT:30.7 sec  CAPILLARY BLOOD GLUCOSE      Assessment:  The patient is a 40y Female who is now several hours post-op from a Laparoscopic cholecystectomy using multiple ports.    Plan:  - Pain control as needed  - CLD, advance to LRD as tolerated.   - DVT ppx  - OOB and ambulating as tolerated      [facility]-Team Surgery [id number] POST-OPERATIVE NOTE    Subjective:  Patient is s/p Laparoscopic cholecystectomy using multiple ports. Patient was prepped to go home same day after surgery but kept admitted for overnight observation and pain control. Patient examined at admitting floor. Patient reports pain from abdomen. Denies nausea/vomiting. Tolerating PO sips water. Denies CP, SOB, fevers/chills. Voided multiple times since surgery.     Vital Signs Last 24 Hrs  T(C): 36.4 (09 Dec 2021 01:22), Max: 36.8 (08 Dec 2021 09:54)  T(F): 97.6 (09 Dec 2021 01:22), Max: 98.3 (08 Dec 2021 09:54)  HR: 66 (09 Dec 2021 01:22) (59 - 85)  BP: 143/67 (09 Dec 2021 01:22) (111/64 - 155/80)  BP(mean): 91 (09 Dec 2021 00:30) (79 - 100)  RR: 16 (09 Dec 2021 01:22) (13 - 20)  SpO2: 100% (09 Dec 2021 01:22) (96% - 100%)  I&O's Detail    08 Dec 2021 07:01  -  09 Dec 2021 02:37  --------------------------------------------------------  IN:    Oral Fluid: 120 mL  Total IN: 120 mL    OUT:    Voided (mL): 400 mL  Total OUT: 400 mL    Total NET: -280 mL        enoxaparin Injectable 40    PAST MEDICAL & SURGICAL HISTORY:  No pertinent past medical history    H/O gastric sleeve      Physical Exam:   GEN: resting in bed comfortably in NAD  RESP: no increased WOB  ABD: soft, non-distended, appropriately tender around incisions without rebound tenderness or guarding.   EXTR: warm, well-perfused without gross deformities  NEURO: awake, alert     LABS:                        13.7   14.04 )-----------( 309      ( 08 Dec 2021 02:17 )             43.6     12-08    137  |  100  |  19  ----------------------------<  101<H>  3.9   |  27  |  0.66    Ca    10.0      08 Dec 2021 02:17    TPro  7.9  /  Alb  4.6  /  TBili  0.3  /  DBili  x   /  AST  12  /  ALT  8   /  AlkPhos  82  12-08    PT/INR - ( 08 Dec 2021 05:23 )   PT: 13.5 sec;   INR: 1.19 ratio         PTT - ( 08 Dec 2021 05:23 )  PTT:30.7 sec  CAPILLARY BLOOD GLUCOSE      Assessment:  The patient is a 40y Female who is now several hours post-op from a Laparoscopic cholecystectomy using multiple ports.    Plan:  - Pain control as needed  - CLD, advance to LRD as tolerated.   - DVT ppx  - OOB and ambulating as tolerated  - Likely d/c home tomorrow      [facility]-Team Surgery [phone]

## 2021-12-09 NOTE — PROGRESS NOTE ADULT - ASSESSMENT
40F w/ hx of gastric sleeve presenting with acute cholecystitis s/p Lap Cholecystitis.      - Will keep for overnight observation and post op pain control  - Admit to surgery  - IV Fluids  - CLD, advance to LRD as tolerated  - Pain control     B team surgery  Pager [phone]   40F w/ hx of gastric sleeve presenting with acute cholecystitis POD #1 from Lap Cholecystectomy.     Plan:  - Regular diet  - IV Fluids  - Pain control PRN  - DVT ppx  - dispo: likely home today     B team surgery  Pager [phone]

## 2021-12-09 NOTE — PATIENT PROFILE ADULT - NSPROPTRIGHTREPPHONE_GEN_A_NUR
Standard Counseling: I stressed the importance of daily sun protection with Vitamin D3 supplements when clinically appropriate, as well as regular self-examinations and skin and mucosal membranes. Call the office if new lesions of concern, or if they see signs of recurrence of previously treated malignancies.
Additional Instructions: Standard
Invasive Melanoma Counseling: I stressed the importance of regular monthly self-examinations. They should examine the buttocks, gluteal cleft, genitalia and bottom of the feet with a hand held mirror.
Detail Level: Detailed
[id number]

## 2021-12-09 NOTE — DISCHARGE NOTE PROVIDER - HOSPITAL COURSE
40F w/ hx of gastric sleeve presented to [facility] 12/8/21 with RUQ pain for one day. The pain began day prior to admission after eating fried cheese and bananas for breakfast. It got worse throughout the day until it was severe and she vomited several times. She has never had RUQ pain in the past.     She has no active medical problems and takes no medications. Her gastric sleeve was done in May 2021 at [facility] and her post op course was uneventful.    Abdominal ultrasound: Cholelithiasis without sonographic stigmata of acute cholecystitis.    Patient was admitted to the surgical service, given concern for cholecystitis; made NPO and started on IVF. She was taken to the operating room 12/8 and underwent laparoscopic cholecystectomy. Pt tolerated procedure well, without complication. Pt remained hemodynamically stable in the PACU and transferred to the surgical floor.     Diet was restarted and advanced as tolerated. Pain control was transitioned from IV to PO pain meds. Pt currently ambulating, voiding, tolerating a regular diet, with pain well controlled on PO pain meds. Patient is stable for discharge home to follow up as an outpatient, instructed to call to schedule appointment.

## 2021-12-10 VITALS
OXYGEN SATURATION: 99 % | RESPIRATION RATE: 18 BRPM | HEART RATE: 69 BPM | DIASTOLIC BLOOD PRESSURE: 56 MMHG | TEMPERATURE: 98 F | SYSTOLIC BLOOD PRESSURE: 103 MMHG

## 2021-12-10 LAB
ANION GAP SERPL CALC-SCNC: 10 MMOL/L — SIGNIFICANT CHANGE UP (ref 7–14)
APPEARANCE UR: ABNORMAL
BACTERIA # UR AUTO: ABNORMAL
BILIRUB UR-MCNC: NEGATIVE — SIGNIFICANT CHANGE UP
BUN SERPL-MCNC: 6 MG/DL — LOW (ref 7–23)
CALCIUM SERPL-MCNC: 8.4 MG/DL — SIGNIFICANT CHANGE UP (ref 8.4–10.5)
CHLORIDE SERPL-SCNC: 104 MMOL/L — SIGNIFICANT CHANGE UP (ref 98–107)
CO2 SERPL-SCNC: 24 MMOL/L — SIGNIFICANT CHANGE UP (ref 22–31)
COLOR SPEC: ABNORMAL
CREAT SERPL-MCNC: 0.6 MG/DL — SIGNIFICANT CHANGE UP (ref 0.5–1.3)
DIFF PNL FLD: ABNORMAL
EPI CELLS # UR: 13 /HPF — HIGH (ref 0–5)
GLUCOSE SERPL-MCNC: 80 MG/DL — SIGNIFICANT CHANGE UP (ref 70–99)
GLUCOSE UR QL: NEGATIVE — SIGNIFICANT CHANGE UP
HCT VFR BLD CALC: 32.9 % — LOW (ref 34.5–45)
HCT VFR BLD CALC: 33.7 % — LOW (ref 34.5–45)
HGB BLD-MCNC: 10.4 G/DL — LOW (ref 11.5–15.5)
HGB BLD-MCNC: 10.8 G/DL — LOW (ref 11.5–15.5)
HYALINE CASTS # UR AUTO: 2 /LPF — SIGNIFICANT CHANGE UP (ref 0–7)
KETONES UR-MCNC: ABNORMAL
LEUKOCYTE ESTERASE UR-ACNC: ABNORMAL
MAGNESIUM SERPL-MCNC: 1.9 MG/DL — SIGNIFICANT CHANGE UP (ref 1.6–2.6)
MCHC RBC-ENTMCNC: 27.8 PG — SIGNIFICANT CHANGE UP (ref 27–34)
MCHC RBC-ENTMCNC: 27.8 PG — SIGNIFICANT CHANGE UP (ref 27–34)
MCHC RBC-ENTMCNC: 31.6 GM/DL — LOW (ref 32–36)
MCHC RBC-ENTMCNC: 32 GM/DL — SIGNIFICANT CHANGE UP (ref 32–36)
MCV RBC AUTO: 86.9 FL — SIGNIFICANT CHANGE UP (ref 80–100)
MCV RBC AUTO: 88 FL — SIGNIFICANT CHANGE UP (ref 80–100)
NITRITE UR-MCNC: NEGATIVE — SIGNIFICANT CHANGE UP
NRBC # BLD: 0 /100 WBCS — SIGNIFICANT CHANGE UP
NRBC # BLD: 0 /100 WBCS — SIGNIFICANT CHANGE UP
NRBC # FLD: 0 K/UL — SIGNIFICANT CHANGE UP
NRBC # FLD: 0 K/UL — SIGNIFICANT CHANGE UP
PH UR: 6.5 — SIGNIFICANT CHANGE UP (ref 5–8)
PHOSPHATE SERPL-MCNC: 2.8 MG/DL — SIGNIFICANT CHANGE UP (ref 2.5–4.5)
PLATELET # BLD AUTO: 212 K/UL — SIGNIFICANT CHANGE UP (ref 150–400)
PLATELET # BLD AUTO: 232 K/UL — SIGNIFICANT CHANGE UP (ref 150–400)
POTASSIUM SERPL-MCNC: 3.6 MMOL/L — SIGNIFICANT CHANGE UP (ref 3.5–5.3)
POTASSIUM SERPL-SCNC: 3.6 MMOL/L — SIGNIFICANT CHANGE UP (ref 3.5–5.3)
PROT UR-MCNC: ABNORMAL
RBC # BLD: 3.74 M/UL — LOW (ref 3.8–5.2)
RBC # BLD: 3.88 M/UL — SIGNIFICANT CHANGE UP (ref 3.8–5.2)
RBC # FLD: 14 % — SIGNIFICANT CHANGE UP (ref 10.3–14.5)
RBC # FLD: 14.1 % — SIGNIFICANT CHANGE UP (ref 10.3–14.5)
RBC CASTS # UR COMP ASSIST: >720 /HPF — HIGH (ref 0–4)
SODIUM SERPL-SCNC: 138 MMOL/L — SIGNIFICANT CHANGE UP (ref 135–145)
SP GR SPEC: 1.01 — SIGNIFICANT CHANGE UP (ref 1–1.05)
UROBILINOGEN FLD QL: SIGNIFICANT CHANGE UP
WBC # BLD: 6.26 K/UL — SIGNIFICANT CHANGE UP (ref 3.8–10.5)
WBC # BLD: 6.9 K/UL — SIGNIFICANT CHANGE UP (ref 3.8–10.5)
WBC # FLD AUTO: 6.26 K/UL — SIGNIFICANT CHANGE UP (ref 3.8–10.5)
WBC # FLD AUTO: 6.9 K/UL — SIGNIFICANT CHANGE UP (ref 3.8–10.5)
WBC UR QL: 36 /HPF — HIGH (ref 0–5)

## 2021-12-10 PROCEDURE — 74018 RADEX ABDOMEN 1 VIEW: CPT | Mod: 26

## 2021-12-10 RX ORDER — PANTOPRAZOLE SODIUM 20 MG/1
1 TABLET, DELAYED RELEASE ORAL
Qty: 30 | Refills: 0
Start: 2021-12-10 | End: 2022-01-08

## 2021-12-10 RX ORDER — OXYCODONE HYDROCHLORIDE 5 MG/1
1 TABLET ORAL
Qty: 12 | Refills: 0
Start: 2021-12-10 | End: 2021-12-12

## 2021-12-10 RX ORDER — POTASSIUM CHLORIDE 20 MEQ
40 PACKET (EA) ORAL ONCE
Refills: 0 | Status: COMPLETED | OUTPATIENT
Start: 2021-12-10 | End: 2021-12-10

## 2021-12-10 RX ORDER — PANTOPRAZOLE SODIUM 20 MG/1
1 TABLET, DELAYED RELEASE ORAL
Qty: 0 | Refills: 0 | DISCHARGE
Start: 2021-12-10

## 2021-12-10 RX ORDER — OXYCODONE HYDROCHLORIDE 5 MG/1
1 TABLET ORAL
Qty: 8 | Refills: 0
Start: 2021-12-10 | End: 2021-12-11

## 2021-12-10 RX ORDER — IBUPROFEN 200 MG
1 TABLET ORAL
Qty: 0 | Refills: 0 | DISCHARGE
Start: 2021-12-10

## 2021-12-10 RX ORDER — ACETAMINOPHEN 500 MG
2 TABLET ORAL
Qty: 56 | Refills: 0
Start: 2021-12-10 | End: 2021-12-16

## 2021-12-10 RX ORDER — IBUPROFEN 200 MG
1 TABLET ORAL
Qty: 28 | Refills: 0
Start: 2021-12-10 | End: 2021-12-16

## 2021-12-10 RX ADMIN — Medication 600 MILLIGRAM(S): at 01:24

## 2021-12-10 RX ADMIN — Medication 975 MILLIGRAM(S): at 04:36

## 2021-12-10 RX ADMIN — Medication 40 MILLIEQUIVALENT(S): at 10:08

## 2021-12-10 RX ADMIN — Medication 975 MILLIGRAM(S): at 10:08

## 2021-12-10 RX ADMIN — ENOXAPARIN SODIUM 40 MILLIGRAM(S): 100 INJECTION SUBCUTANEOUS at 13:02

## 2021-12-10 RX ADMIN — Medication 100 MILLIGRAM(S): at 13:02

## 2021-12-10 RX ADMIN — Medication 600 MILLIGRAM(S): at 13:01

## 2021-12-10 RX ADMIN — Medication 600 MILLIGRAM(S): at 06:35

## 2021-12-10 RX ADMIN — Medication 1 MILLIGRAM(S): at 13:02

## 2021-12-10 NOTE — PROVIDER CONTACT NOTE (OTHER) - ASSESSMENT
Patient's vital signs stable, afebrile, denies pain on urination, denies dizziness/sob, no acute distress noted.

## 2021-12-10 NOTE — PROGRESS NOTE ADULT - ASSESSMENT
40F w/ hx of gastric sleeve presenting with acute cholecystitis POD #1 from Lap Cholecystectomy.     Plan:  - Regular diet  - IV Fluids  - Pain control PRN  - DVT ppx  - dispo: likely home today     B team surgery  Pager [phone]   40F w/ hx of gastric sleeve presenting with acute cholecystitis POD #2 from Lap Cholecystectomy.     Plan:  - Regular diet  - Pain control PRN  - DVT ppx  - dispo: likely home today     B team surgery  Pager [phone]

## 2021-12-10 NOTE — PROGRESS NOTE ADULT - SUBJECTIVE AND OBJECTIVE BOX
Surgery Progress Note  Patient is a 40y old  Female who presents with a chief complaint of     INTERVAL EVENTS: No acute events overnight.      SUBJECTIVE: Patient seen and examined at bedside with surgical team, patient without complaints. Denies fever, chills, CP, SOB nausea, vomiting, abdominal pain.      OBJECTIVE:    Vital Signs Last 24 Hrs  T(C): 37.1 (10 Dec 2021 01:25), Max: 37.1 (10 Dec 2021 01:25)  T(F): 98.7 (10 Dec 2021 01:25), Max: 98.7 (10 Dec 2021 01:25)  HR: 85 (10 Dec 2021 01:25) (68 - 97)  BP: 102/60 (10 Dec 2021 01:25) (102/60 - 141/72)  BP(mean): --  RR: 18 (10 Dec 2021 01:25) (16 - 18)  SpO2: 99% (10 Dec 2021 01:25) (97% - 100%)I&O's Detail    08 Dec 2021 07:01  -  09 Dec 2021 07:00  --------------------------------------------------------  IN:    Oral Fluid: 120 mL  Total IN: 120 mL    OUT:    Voided (mL): 400 mL  Total OUT: 400 mL    Total NET: -280 mL      09 Dec 2021 07:01  -  10 Dec 2021 02:36  --------------------------------------------------------  IN:  Total IN: 0 mL    OUT:    Voided (mL): 1250 mL  Total OUT: 1250 mL    Total NET: -1250 mL      MEDICATIONS  (STANDING):  acetaminophen     Tablet .. 975 milliGRAM(s) Oral every 6 hours  enoxaparin Injectable 40 milliGRAM(s) SubCutaneous daily  folic acid Injectable 1 milliGRAM(s) IV Push daily  ibuprofen  Tablet. 600 milliGRAM(s) Oral every 6 hours  lactated ringers. 1000 milliLiter(s) (100 mL/Hr) IV Continuous <Continuous>  pantoprazole    Tablet 40 milliGRAM(s) Oral at bedtime  thiamine Injectable 100 milliGRAM(s) IV Push daily    MEDICATIONS  (PRN):  oxyCODONE    IR 5 milliGRAM(s) Oral every 4 hours PRN Moderate Pain (4 - 6)  oxyCODONE    IR 10 milliGRAM(s) Oral every 4 hours PRN Severe Pain (7 - 10)  oxyCODONE    IR 2.5 milliGRAM(s) Oral every 4 hours PRN Mild Pain (1 - 3)      PHYSICAL EXAM:      LABS:                        13.0   10.24 )-----------( 247      ( 09 Dec 2021 06:41 )             41.2     12-09    133<L>  |  100  |  8   ----------------------------<  125<H>  4.2   |  20<L>  |  0.54    Ca    9.2      09 Dec 2021 06:41  Phos  3.7     12-09  Mg     1.70     12-09    TPro  7.2  /  Alb  3.9  /  TBili  0.6  /  DBili  x   /  AST  45<H>  /  ALT  31  /  AlkPhos  79  12-09    PT/INR - ( 08 Dec 2021 05:23 )   PT: 13.5 sec;   INR: 1.19 ratio         PTT - ( 08 Dec 2021 05:23 )  PTT:30.7 sec  LIVER FUNCTIONS - ( 09 Dec 2021 06:41 )  Alb: 3.9 g/dL / Pro: 7.2 g/dL / ALK PHOS: 79 U/L / ALT: 31 U/L / AST: 45 U/L / GGT: x                 IMAGING:     Surgery Progress Note    INTERVAL EVENTS: No acute events overnight.      SUBJECTIVE: Patient seen and examined at bedside with surgical team, patient without complaints. Denies fever, chills, CP, SOB nausea, vomiting, abdominal pain.      OBJECTIVE:  Vital Signs Last 24 Hrs  T(C): 37.1 (10 Dec 2021 06:34), Max: 37.1 (10 Dec 2021 01:25)  T(F): 98.7 (10 Dec 2021 06:34), Max: 98.7 (10 Dec 2021 01:25)  HR: 80 (10 Dec 2021 06:34) (68 - 97)  BP: 114/67 (10 Dec 2021 06:34) (102/60 - 140/82)  BP(mean): --  RR: 18 (10 Dec 2021 06:34) (16 - 18)  SpO2: 99% (10 Dec 2021 06:34) (97% - 100%)      09 Dec 2021 07:01  -  10 Dec 2021 07:00  --------------------------------------------------------  IN:  Total IN: 0 mL    OUT:    Voided (mL): 1250 mL  Total OUT: 1250 mL    Total NET: -1250 mL            MEDICATIONS  (STANDING):  acetaminophen     Tablet .. 975 milliGRAM(s) Oral every 6 hours  enoxaparin Injectable 40 milliGRAM(s) SubCutaneous daily  folic acid Injectable 1 milliGRAM(s) IV Push daily  ibuprofen  Tablet. 600 milliGRAM(s) Oral every 6 hours  lactated ringers. 1000 milliLiter(s) (100 mL/Hr) IV Continuous <Continuous>  pantoprazole    Tablet 40 milliGRAM(s) Oral at bedtime  thiamine Injectable 100 milliGRAM(s) IV Push daily    MEDICATIONS  (PRN):  oxyCODONE    IR 5 milliGRAM(s) Oral every 4 hours PRN Moderate Pain (4 - 6)  oxyCODONE    IR 10 milliGRAM(s) Oral every 4 hours PRN Severe Pain (7 - 10)  oxyCODONE    IR 2.5 milliGRAM(s) Oral every 4 hours PRN Mild Pain (1 - 3)      Physical Exam:  General: Lying in bed in NAD  Head: NCAT, no visible lesions   Chest: no visible deformity, nonlabored respirations   Abdomen: nondistended, +perincisional and RUQ tenderness, no rebound or guarding        LABS:                        13.0   10.24 )-----------( 247      ( 09 Dec 2021 06:41 )             41.2     12-09    133<L>  |  100  |  8   ----------------------------<  125<H>  4.2   |  20<L>  |  0.54    Ca    9.2      09 Dec 2021 06:41  Phos  3.7     12-09  Mg     1.70     12-09    TPro  7.2  /  Alb  3.9  /  TBili  0.6  /  DBili  x   /  AST  45<H>  /  ALT  31  /  AlkPhos  79  12-09    PT/INR - ( 08 Dec 2021 05:23 )   PT: 13.5 sec;   INR: 1.19 ratio         PTT - ( 08 Dec 2021 05:23 )  PTT:30.7 sec  LIVER FUNCTIONS - ( 09 Dec 2021 06:41 )  Alb: 3.9 g/dL / Pro: 7.2 g/dL / ALK PHOS: 79 U/L / ALT: 31 U/L / AST: 45 U/L / GGT: x

## 2021-12-14 LAB — SURGICAL PATHOLOGY STUDY: SIGNIFICANT CHANGE UP

## 2022-01-25 ENCOUNTER — RESULT REVIEW (OUTPATIENT)
Age: 42
End: 2022-01-25

## 2022-01-25 ENCOUNTER — APPOINTMENT (OUTPATIENT)
Dept: TRAUMA SURGERY | Facility: HOSPITAL | Age: 42
End: 2022-01-25

## 2022-01-25 VITALS
TEMPERATURE: 97 F | DIASTOLIC BLOOD PRESSURE: 76 MMHG | WEIGHT: 148 LBS | HEIGHT: 61 IN | HEART RATE: 83 BPM | BODY MASS INDEX: 27.94 KG/M2 | SYSTOLIC BLOOD PRESSURE: 126 MMHG

## 2022-01-25 DIAGNOSIS — R10.13 EPIGASTRIC PAIN: ICD-10-CM

## 2022-01-25 PROBLEM — Z00.00 ENCOUNTER FOR PREVENTIVE HEALTH EXAMINATION: Status: ACTIVE | Noted: 2022-01-25

## 2022-01-30 PROBLEM — R10.13 EPIGASTRIC PAIN: Status: ACTIVE | Noted: 2022-01-30

## 2022-01-30 NOTE — HISTORY OF PRESENT ILLNESS
[de-identified] : Mrs Alejandre follows up now about 1.5months out from her laparoscopic cholecystectomy complaining of unremitting right upper quadrant pain. Its burning and pulling in character and is constant.  It wakes her from sleep and limites her daily activities. She denies fever/chills, icterus and bowel habit changes. Her previous gastric sleeve history is noted, no recent EGD. No antacid therapy given yet.

## 2022-01-30 NOTE — PHYSICAL EXAM
[de-identified] : Well ,comfortable. [de-identified] : No icteric sclera  [de-identified] : Soft, ND, slight tenderness in epigastrium. No CVA tenderness

## 2022-02-17 ENCOUNTER — APPOINTMENT (OUTPATIENT)
Dept: CT IMAGING | Facility: IMAGING CENTER | Age: 42
End: 2022-02-17
Payer: MEDICAID

## 2022-02-17 ENCOUNTER — OUTPATIENT (OUTPATIENT)
Dept: OUTPATIENT SERVICES | Facility: HOSPITAL | Age: 42
LOS: 1 days | End: 2022-02-17
Payer: MEDICAID

## 2022-02-17 DIAGNOSIS — R10.13 EPIGASTRIC PAIN: ICD-10-CM

## 2022-02-17 DIAGNOSIS — Z90.3 ACQUIRED ABSENCE OF STOMACH [PART OF]: Chronic | ICD-10-CM

## 2022-02-17 PROCEDURE — 74177 CT ABD & PELVIS W/CONTRAST: CPT | Mod: 26

## 2022-02-17 PROCEDURE — 74177 CT ABD & PELVIS W/CONTRAST: CPT

## 2022-09-06 ENCOUNTER — EMERGENCY (EMERGENCY)
Facility: HOSPITAL | Age: 42
LOS: 1 days | Discharge: HOME | End: 2022-09-06
Attending: EMERGENCY MEDICINE
Payer: MEDICAID

## 2022-09-06 VITALS
HEART RATE: 81 BPM | WEIGHT: 149.91 LBS | OXYGEN SATURATION: 100 % | DIASTOLIC BLOOD PRESSURE: 89 MMHG | RESPIRATION RATE: 19 BRPM | TEMPERATURE: 98 F | HEIGHT: 61 IN | SYSTOLIC BLOOD PRESSURE: 133 MMHG

## 2022-09-06 DIAGNOSIS — Z90.3 ACQUIRED ABSENCE OF STOMACH [PART OF]: Chronic | ICD-10-CM

## 2022-09-06 LAB — HCG UR QL: NEGATIVE — SIGNIFICANT CHANGE UP

## 2022-09-06 PROCEDURE — 99284 EMERGENCY DEPT VISIT MOD MDM: CPT

## 2022-09-06 PROCEDURE — 81025 URINE PREGNANCY TEST: CPT

## 2022-09-06 PROCEDURE — 99284 EMERGENCY DEPT VISIT MOD MDM: CPT | Mod: 25

## 2022-09-06 PROCEDURE — 70450 CT HEAD/BRAIN W/O DYE: CPT | Mod: MA

## 2022-09-06 PROCEDURE — 96374 THER/PROPH/DIAG INJ IV PUSH: CPT

## 2022-09-06 PROCEDURE — 96375 TX/PRO/DX INJ NEW DRUG ADDON: CPT

## 2022-09-06 PROCEDURE — 70450 CT HEAD/BRAIN W/O DYE: CPT | Mod: 26,MA

## 2022-09-06 RX ORDER — KETOROLAC TROMETHAMINE 30 MG/ML
30 SYRINGE (ML) INJECTION ONCE
Refills: 0 | Status: DISCONTINUED | OUTPATIENT
Start: 2022-09-06 | End: 2022-09-06

## 2022-09-06 RX ORDER — SODIUM CHLORIDE 9 MG/ML
3 INJECTION INTRAMUSCULAR; INTRAVENOUS; SUBCUTANEOUS ONCE
Refills: 0 | Status: COMPLETED | OUTPATIENT
Start: 2022-09-06 | End: 2022-09-06

## 2022-09-06 RX ORDER — DEXAMETHASONE 0.5 MG/5ML
10 ELIXIR ORAL ONCE
Refills: 0 | Status: COMPLETED | OUTPATIENT
Start: 2022-09-06 | End: 2022-09-06

## 2022-09-06 RX ORDER — METOCLOPRAMIDE HCL 10 MG
10 TABLET ORAL ONCE
Refills: 0 | Status: COMPLETED | OUTPATIENT
Start: 2022-09-06 | End: 2022-09-06

## 2022-09-06 RX ADMIN — Medication 30 MILLIGRAM(S): at 14:20

## 2022-09-06 RX ADMIN — Medication 10 MILLIGRAM(S): at 14:21

## 2022-09-06 RX ADMIN — SODIUM CHLORIDE 3 MILLILITER(S): 9 INJECTION INTRAMUSCULAR; INTRAVENOUS; SUBCUTANEOUS at 14:27

## 2022-09-06 RX ADMIN — Medication 102 MILLIGRAM(S): at 15:59

## 2022-09-06 NOTE — ED PROVIDER NOTE - OBJECTIVE STATEMENT
41 year old female with a PHMx of migraines presents to the ED with complaints of migraines for 8 days w/o remission. Patent states she was sent here for evaluation by urgent care and that the difference compared to previous migraines is that this lasts longer and has not responded to any oral treatment. Also endorses episodic dizziness, nausea, and describes the migraine as diffuse. Denies photophobia or phonophobia.   NKDA.

## 2022-09-06 NOTE — ED PROVIDER NOTE - PATIENT PORTAL LINK FT
You can access the FollowMyHealth Patient Portal offered by [facility] by registering at the following website: [email or web address]. By joining [employer] [facility] [employer]’s FollowMyHealth portal, you will also be able to view your health information using other applications (apps) compatible with our system.

## 2022-09-06 NOTE — ED PROVIDER NOTE - NSFOLLOWUPINSTRUCTIONS_ED_ALL_ED_FT
Log Out.      [facility] CareNotes®     :  [facility]  	                     ACUTE HEADACHE - AfterCare(R) Instructions(ER/ED)           Acute Headache    WHAT YOU NEED TO KNOW:    An acute headache is pain or discomfort that may start suddenly and get worse quickly. You may have an acute headache only when you feel stress or eat certain foods. Other acute headache pain can happen every day, and sometimes several times a day.     DISCHARGE INSTRUCTIONS:    Return to the emergency department if:   •You have severe pain.      •You have numbness or weakness on one side of your face or body.      •You have a headache that occurs after a blow to the head, a fall, or other trauma.       •You have a headache, are forgetful or confused, or have trouble speaking.      •You have a headache, stiff neck, and a fever.      Call your doctor if:   •You have a constant headache and are vomiting.      •You have a headache each day that does not get better, even after treatment.      •You have changes in your headaches, or new symptoms that occur when you have a headache.      •You have questions or concerns about your condition or care.      Medicines: You may need any of the following:   •Prescription pain medicine may be given. The medicine your healthcare provider recommends will depend on the kind of headaches you have. You will need to take prescription headache medicines as directed to prevent a problem called rebound headache. These headaches happen with regular use of pain relievers for headache disorders.      •NSAIDs, such as ibuprofen, help decrease swelling, pain, and fever. This medicine is available with or without a doctor's order. NSAIDs can cause stomach bleeding or kidney problems in certain people. If you take blood thinner medicine, always ask your healthcare provider if NSAIDs are safe for you. Always read the medicine label and follow directions.      •Acetaminophen decreases pain and fever. It is available without a doctor's order. Ask how much to take and how often to take it. Follow directions. Read the labels of all other medicines you are using to see if they also contain acetaminophen, or ask your doctor or pharmacist. Acetaminophen can cause liver damage if not taken correctly.      •Antidepressants may be given for some kinds of headaches.      •Take your medicine as directed. Contact your healthcare provider if you think your medicine is not helping or if you have side effects. Tell your provider if you are allergic to any medicine. Keep a list of the medicines, vitamins, and herbs you take. Include the amounts, and when and why you take them. Bring the list or the pill bottles to follow-up visits. Carry your medicine list with you in case of an emergency.      Manage your symptoms:   •Apply heat or ice on the headache area. Use a heat or ice pack. For an ice pack, you can also put crushed ice in a plastic bag. Cover the pack or bag with a towel before you apply it to your skin. Ice and heat both help decrease pain, and heat also helps decrease muscle spasms. Apply heat for 20 to 30 minutes every 2 hours. Apply ice for 15 to 20 minutes every hour. Apply heat or ice for as long and for as many days as directed. You may alternate heat and ice.      •Relax your muscles. Lie down in a comfortable position and close your eyes. Relax your muscles slowly. Start at your toes and work your way up your body.      •Keep a record of your headaches. Write down when your headaches start and stop. Include your symptoms and what you were doing when the headache began. Record what you ate or drank for 24 hours before the headache started. Describe the pain and where it hurts. Keep track of what you did to treat your headache and if it worked.       Prevent an acute headache:   •Avoid anything that triggers an acute headache. Examples include exposure to chemicals, going to high altitude, or not getting enough sleep. Create a regular sleep routine. Go to sleep at the same time and wake up at the same time each day. Do not use electronic devices before bedtime. These may trigger a headache or prevent you from sleeping well.      •Do not smoke. Nicotine and other chemicals in cigarettes and cigars can trigger an acute headache or make it worse. Ask your healthcare provider for information if you currently smoke and need help to quit. E-cigarettes or smokeless tobacco still contain nicotine. Talk to your healthcare provider before you use these products.       •Limit alcohol as directed. Alcohol can trigger an acute headache or make it worse. If you have cluster headaches, do not drink alcohol during an episode. For other types of headaches, ask your healthcare provider if it is safe for you to drink alcohol. Ask how much is safe for you to drink, and how often.      •Exercise as directed. Exercise can reduce tension and help with headache pain. Aim for 30 minutes of physical activity on most days of the week. Your healthcare provider can help you create an exercise plan.      •Eat a variety of healthy foods. Healthy foods include fruits, vegetables, low-fat dairy products, lean meats, fish, whole grains, and cooked beans. Your healthcare provider or dietitian can help you create meals plans if you need to avoid foods that trigger headaches.      Follow up with your healthcare provider as directed: Bring your headache record with you when you see your healthcare provider. Write down your questions so you remember to ask them during your visits.       © Copyright [employer] 2022           back to top                          © Copyright [employer] 2022

## 2022-09-06 NOTE — ED PROVIDER NOTE - NSFOLLOWUPCLINICS_GEN_ALL_ED_FT
[facility]  Neurology  [address] [place] [address]  Phone: [phone]  Fax: [phone]

## 2022-09-06 NOTE — ED PROVIDER NOTE - CLINICAL SUMMARY MEDICAL DECISION MAKING FREE TEXT BOX
Patient with atypical migraine. Will give Toradol and Reglan and reevaluate after that. If patient still has continuos migraine, will get CT.

## 2022-09-06 NOTE — ED ADULT NURSE NOTE - NSIMPLEMENTINTERV_GEN_ALL_ED
Implemented All Universal Safety Interventions:  [place] to call system. Call bell, personal items and telephone within reach. Instruct patient to call for assistance. Room bathroom lighting operational. Non-slip footwear when patient is off stretcher. Physically safe environment: no spills, clutter or unnecessary equipment. Stretcher in lowest position, wheels locked, appropriate side rails in place.

## 2022-09-06 NOTE — ED PROVIDER NOTE - PROGRESS NOTE DETAILS
patient continues to have headache will give decadron and obtain ct scan ct scan is negative. patient is stable

## 2022-09-08 PROBLEM — G43.909 MIGRAINE, UNSPECIFIED, NOT INTRACTABLE, WITHOUT STATUS MIGRAINOSUS: Chronic | Status: ACTIVE | Noted: 2022-09-06

## 2022-09-09 ENCOUNTER — APPOINTMENT (OUTPATIENT)
Dept: NEUROLOGY | Facility: CLINIC | Age: 42
End: 2022-09-09

## 2022-09-09 VITALS
HEIGHT: 61 IN | OXYGEN SATURATION: 100 % | TEMPERATURE: 97.1 F | HEART RATE: 57 BPM | WEIGHT: 148 LBS | DIASTOLIC BLOOD PRESSURE: 85 MMHG | SYSTOLIC BLOOD PRESSURE: 136 MMHG | BODY MASS INDEX: 27.94 KG/M2

## 2022-09-09 DIAGNOSIS — Z80.9 FAMILY HISTORY OF MALIGNANT NEOPLASM, UNSPECIFIED: ICD-10-CM

## 2022-09-09 DIAGNOSIS — R42 DIZZINESS AND GIDDINESS: ICD-10-CM

## 2022-09-09 DIAGNOSIS — G43.119 MIGRAINE WITH AURA, INTRACTABLE, W/OUT STATUS MIGRAINOSUS: ICD-10-CM

## 2022-09-09 DIAGNOSIS — G43.109 MIGRAINE WITH AURA, NOT INTRACTABLE, W/OUT STATUS MIGRAINOSUS: ICD-10-CM

## 2022-09-09 PROCEDURE — 99205 OFFICE O/P NEW HI 60 MIN: CPT

## 2022-09-09 RX ORDER — NAPROXEN 375 MG/1
375 TABLET ORAL
Qty: 20 | Refills: 5 | Status: ACTIVE | COMMUNITY
Start: 2022-09-09 | End: 1900-01-01

## 2022-09-09 RX ORDER — METHYLPREDNISOLONE 4 MG/1
4 TABLET ORAL
Qty: 1 | Refills: 0 | Status: ACTIVE | COMMUNITY
Start: 2022-09-09 | End: 1900-01-01

## 2022-09-09 NOTE — PHYSICAL EXAM
[General Appearance - Alert] : alert [General Appearance - In No Acute Distress] : in no acute distress [Person] : oriented to person [Place] : oriented to place [Time] : oriented to time [Concentration Intact] : normal concentrating ability [Naming Objects] : no difficulty naming common objects [Fluency] : fluency intact [Comprehension] : comprehension intact [Vocabulary] : adequate range of vocabulary [Cranial Nerves Optic (II)] : visual acuity intact bilaterally,  visual fields full to confrontation, pupils equal round and reactive to light [Cranial Nerves Oculomotor (III)] : extraocular motion intact [Cranial Nerves Trigeminal (V)] : facial sensation intact symmetrically [Cranial Nerves Facial (VII)] : face symmetrical [Cranial Nerves Vestibulocochlear (VIII)] : hearing was intact bilaterally [Cranial Nerves Glossopharyngeal (IX)] : tongue and palate midline [Cranial Nerves Accessory (XI - Cranial And Spinal)] : head turning and shoulder shrug symmetric [Cranial Nerves Hypoglossal (XII)] : there was no tongue deviation with protrusion [Motor Tone] : muscle tone was normal in all four extremities [Motor Strength] : muscle strength was normal in all four extremities [Involuntary Movements] : no involuntary movements were seen [Sensation Tactile Decrease] : light touch was intact [Abnormal Walk] : normal gait [Balance] : balance was intact [2+] : Ankle jerk left 2+ [Coordination - Dysmetria Impaired Finger-to-Nose Bilateral] : not present [Coordination - Dysmetria Impaired Heel-to-Shin Bilateral] : not present [Plantar Reflex Right Only] : normal on the right [Plantar Reflex Left Only] : normal on the left

## 2022-09-09 NOTE — DATA REVIEWED
[de-identified] : surgery note appreciated\par path noted\par CTH abd/ pelvis noted\par \par ACC: [id number] EXAM: CT BRAIN\par \par PROCEDURE DATE: 09/06/2022\par \par \par \par INTERPRETATION: INDICATIONS: intractable migraine\par \par TECHNIQUE: Serial axial images were obtained from the skull base to the vertex without intravenous contrast. Sagittal and Coronal reformats were performed\par \par COMPARISON EXAMINATION: None.\par \par FINDINGS:\par VENTRICLES AND SULCI: Normal.\par INTRA-AXIAL: No mass, blood or abnormal attenuation is seen.\par EXTRA-AXIAL: No mass or collection is seen.\par VISUALIZED SINUSES: Clear.\par VISUALIZED MASTOIDS: Clear.\par CALVARIUM: Normal.\par MISCELLANEOUS: None.\par \par IMPRESSION: Normal non-contrast CT of the brain.\par \par --- End of Report ---\par \par \par \par \par \par EMY ORTA MD; Attending Radiologist\par This document has been electronically signed. Sep 6 2022 7:01PM\par \par ED note appreciate\par Pregnancy test negative

## 2022-09-09 NOTE — HISTORY OF PRESENT ILLNESS
[FreeTextEntry1] : The patient is a 41-year-old woman with past medical history of headaches here for evaluation at new type of headache described as starting with pressure around her eyes, and turning into a throbbing headache in moderate to severe in intensity associated with photo and phonophobia as well as nausea and vomiting.  The headache used to occur once every few months but over the past few months it has been occurring several days per month and for the past few weeks the patient has had a continuous headache despite being treated.  She previously responded to over-the-counter medications but has not done recently.\par \par Additionally, the patient has new onset of dizziness/vertigo associated with a headache.  The patient has poor appetite and nausea and vomiting.\par \par No difficulty with language.  No vision loss or double vision.  No new hearing loss.  No difficulty with speech or swallowing.  No focal weakness.  No focal sensory changes.  No numbness or tingling in the bilateral lower extremities.  No difficulty with balance.  No difficulty with ambulation.\par \par The patient has an IUD and is not planning on further pregnancies at this time.

## 2022-09-09 NOTE — DATA REVIEWED
[de-identified] : surgery note appreciated\par path noted\par CTH abd/ pelvis noted\par \par ACC: [id number] EXAM: CT BRAIN\par \par PROCEDURE DATE: 09/06/2022\par \par \par \par INTERPRETATION: INDICATIONS: intractable migraine\par \par TECHNIQUE: Serial axial images were obtained from the skull base to the vertex without intravenous contrast. Sagittal and Coronal reformats were performed\par \par COMPARISON EXAMINATION: None.\par \par FINDINGS:\par VENTRICLES AND SULCI: Normal.\par INTRA-AXIAL: No mass, blood or abnormal attenuation is seen.\par EXTRA-AXIAL: No mass or collection is seen.\par VISUALIZED SINUSES: Clear.\par VISUALIZED MASTOIDS: Clear.\par CALVARIUM: Normal.\par MISCELLANEOUS: None.\par \par IMPRESSION: Normal non-contrast CT of the brain.\par \par --- End of Report ---\par \par \par \par \par \par EMY ORTA MD; Attending Radiologist\par This document has been electronically signed. Sep 6 2022 7:01PM\par \par ED note appreciate\par Pregnancy test negative

## 2022-09-09 NOTE — ASSESSMENT
[FreeTextEntry1] : Patient has history of headaches and the past month she has had worsening migraine with an aura, increase in intensity and severity, as well as onset of vertigo/dizziness associated with a headache which is a new symptom and raises concern for vertiginous migraines.  Given the change of migraine type and severity will obtain an MRI of the brain and MRA of the head to evaluate posterior circulation as well as to rule out any intracranial lesions.  We will start Medrol pack to stop the patient's headache and will give naproxen 1 tablet at onset of aura, described as eye pressure, and repeat in 2 hours if continues to have the headache.  Limit to maximum 3 days/week.  Headache diary given.  If the patient has more than 4-5 headaches per month, will consider headache preventative medication at next visit.  The patient has an IUD and is not planning on further pregnancies at this time.\par \par I spent the time noted on the day of this patient encounter preparing for, providing and documenting the above E/M service and counseling and educate patient on differential, workup, disease course, and treatment/management. Education was provided to the patient during this encounter. All questions and concerns were answered and addressed in detail. The patient verbalized understanding and agreed to plan. Patient was advised to continue to monitor for neurologic symptoms and to notify my office or go to the nearest emergency room if there are any changes. Any orders/referrals and communications were provided as well. \par Side effects of the above medications were discussed in detail including but not limited to applicable black box warning and teratogenicity as appropriate. \par Patient was advised to bring previous records to my office, including CD of imaging, when applicable. \par \par

## 2022-09-19 NOTE — PRE-OP CHECKLIST - AICD PRESENT
In Patient Progress note      Admit Date: 9/1/2022    Impression:  1. Severe acute kidney injury due to obstructive uropathy. Patient had bilateral PCN.--> creat now stabilizing , had a prerenal component d/t   Poor intake and radiation induced insensible losses   Renal USG with improved hydro , IR was consulted to evaluate the drains   2. Anemia   3   Uterine mass with VELIZ , undergoing radiation   4. Mild hypokalemia -resolved   5. Mild Acidosis likely metabolic- resolved    S/p rt PCN repositioned 9/15  Draining well   No labs resulted today   Radiation daily , induced insensible losses     Plan  1) encourage po intake   2) follow labs   3) onco/radiation following   4) recheck renal functions in AM     Please call with questions,    Antwon Ortega MD FASN  Cell [phone]  Pager: [phone]     Subjective:     - s/p PCN repositioned  - respiratory - stable  - hemodynamics - stable, no pressrs  - UOP-improved   - Nutrition -poor    Objective:     Visit Vitals  /64 (BP 1 Location: Left upper arm, BP Patient Position: At rest)   Pulse (!) 107   Temp 98.3 °F (36.8 °C)   Resp 16   Ht 5' 3\" (1.6 m)   Wt 108.9 kg (240 lb 1.6 oz)   SpO2 99%   Breastfeeding No   BMI 42.53 kg/m²         Intake/Output Summary (Last 24 hours) at 9/19/2022 1642  Last data filed at 9/19/2022 1517  Gross per 24 hour   Intake 720 ml   Output 1650 ml   Net -930 ml         Physical Exam:     General: NAD, alert and oriented. Neck: No jvd. LUNGS: Clear to Auscultation, No rales, rhonchi or wheezes. CVS EXM: S1, S2  RRR No rub. Abdomen: soft, non tender.    Lower Extremities:  trace Edema     Data Review:    Recent Labs     09/19/22  0335 09/18/22  0324   WBC  --  8.2   RBC  --  2.99*   HCT 22.1* 22.4*   MCV  --  74.9*   MCH  --  24.7   MCHC  --  33.0   RDW  --  24.9*       Recent Labs     09/18/22  0324 09/17/22  0314   BUN 46* 47*   CREA 1.35* 1.66*   CA 9.3 9.0   K 4.0 4.3    141    112*   CO2 23 24   * 108* Illene Brunner, MD no

## 2022-12-06 ENCOUNTER — APPOINTMENT (OUTPATIENT)
Dept: NEUROLOGY | Facility: CLINIC | Age: 42
End: 2022-12-06

## 2023-10-24 NOTE — DISCHARGE NOTE NURSING/CASE MANAGEMENT/SOCIAL WORK - DATE OF LAST VACCINATION
Addended by: SEBAS NAZARIO on: 10/24/2023 04:50 PM     Modules accepted: Orders     Pre-Procedure Patient Identification:  I am the Primary Anesthesiologist and have identified the patient on 10/12/22 at 10:49 AM.   I have confirmed the procedure(s) will be performed by the following surgeon/proceduralist Melchor Lee MD.   20-May-2021

## 2024-02-02 NOTE — H&P ADULT - NSICDXPASTMEDICALHX_GEN_ALL_CORE_FT
REFERRING PHYSICIAN: Sarai Julio MD    DATE:  02/01/2024  SURGEON(S):  SARAI JULIO MD    PREOPERATIVE DIAGNOSIS:  Right carpal tunnel syndrome.    POSTOPERATIVE DIAGNOSIS:  Right carpal tunnel syndrome.    PROCEDURE PERFORMED:  Right carpal tunnel release.    ANESTHESIA:  General.    COMPLICATIONS:  None.    BLOOD LOSS:  Nil.    INDICATIONS:  This patient is a 44-year-old female who previously had a left carpal tunnel release performed, now wished to pursue right carpal tunnel release for persistent symptoms.    PROCEDURE IN DETAIL:  The patient was brought to the operating room and was given a general anesthetic.  Cuff was placed on the right brachium.  The right hand and arm prepped and draped in normal sterile fashion.  The arm was exsanguinated.  The cuff was inflated to 250 mmHg.  A standard mini open carpal tunnel incision was made between the thenar and hypothenar eminence through the skin and subcutaneous tissue down through the palmar aponeurosis.  Self-retaining retractors were repositioned and then, a transverse carpal ligament was     sharply incised under direct visualization for complete release.  A thorough lavage was done.  The skin was closed with interrupted 4-0 suture and sterile dressing applied.        Dictated By:  Sarai Julio MD        D:  02/01/2024 15:19:47  T:  02/01/2024 23:31:55  WAZITA/cory  Job:  [id number]      cc:  Sarai Julio MD  
PAST MEDICAL HISTORY:  No pertinent past medical history

## 2024-08-15 ENCOUNTER — NON-APPOINTMENT (OUTPATIENT)
Age: 44
End: 2024-08-15

## 2024-08-16 ENCOUNTER — APPOINTMENT (OUTPATIENT)
Dept: PULMONOLOGY | Facility: CLINIC | Age: 44
End: 2024-08-16
Payer: COMMERCIAL

## 2024-08-16 VITALS
TEMPERATURE: 97.3 F | WEIGHT: 160 LBS | HEART RATE: 85 BPM | OXYGEN SATURATION: 99 % | SYSTOLIC BLOOD PRESSURE: 124 MMHG | BODY MASS INDEX: 30.23 KG/M2 | DIASTOLIC BLOOD PRESSURE: 84 MMHG

## 2024-08-16 DIAGNOSIS — Z01.818 ENCOUNTER FOR OTHER PREPROCEDURAL EXAMINATION: ICD-10-CM

## 2024-08-16 DIAGNOSIS — Z86.39 PERSONAL HISTORY OF OTHER ENDOCRINE, NUTRITIONAL AND METABOLIC DISEASE: ICD-10-CM

## 2024-08-16 PROCEDURE — 94060 EVALUATION OF WHEEZING: CPT

## 2024-08-16 PROCEDURE — 94727 GAS DIL/WSHOT DETER LNG VOL: CPT

## 2024-08-16 PROCEDURE — 99204 OFFICE O/P NEW MOD 45 MIN: CPT | Mod: 25

## 2024-08-16 PROCEDURE — 94729 DIFFUSING CAPACITY: CPT

## 2024-08-16 NOTE — PROCEDURE
[FreeTextEntry1] : PFT 8/16/24: Spirometry was normal.  Lung volumes were normal.  Mild reduction in diffusion.  No significant changes to bronchodilator was noted.

## 2024-08-16 NOTE — HISTORY OF PRESENT ILLNESS
[Never] : never [Recent  Weight Gain] : recent weight gain [TextBox_4] : She is a 43-year-old woman, a never smoker with no history of pulmonary disease, who was having a revision of gastric sleeve surgery in the near future.  She underwent bariatric surgery about 3 years ago.  She has been having GERD since that time.  She was told that she has a hernia that needs to be repaired.  She denies any cough, wheezing or shortness of breath.  No snoring or daytime somnolence.  No witnessed apneas. [Awakes Unrefreshed] : does not awaken unrefreshed [Daytime Somnolence] : denies daytime somnolence [Nonrestorative Sleep] : denies nonrestorative sleep [Snoring] : no snoring [Unusual Sleep Behavior] : no unusual sleep behavior

## 2024-08-16 NOTE — PHYSICAL EXAM
[No Acute Distress] : no acute distress [Well Nourished] : well nourished [Well Groomed] : well groomed [II] : Mallampati Class: II [Normal Appearance] : normal appearance [Normal Rate/Rhythm] : normal rate/rhythm [Normal S1, S2] : normal s1, s2 [No Resp Distress] : no resp distress [Clear to Auscultation Bilaterally] : clear to auscultation bilaterally [Benign] : benign [No HSM] : no hsm [Normal Gait] : normal gait [No Clubbing] : no clubbing [No Cyanosis] : no cyanosis [Normal Turgor] : normal turgor [No Edema] : no edema [No Focal Deficits] : no focal deficits [Oriented x3] : oriented x3 [Normal Affect] : normal affect [TextBox_44] : Goiter

## 2024-08-16 NOTE — DISCUSSION/SUMMARY
[FreeTextEntry1] : Impression No active pulmonary disease at the present time -No suggestion of sleep disordered breathing No pulmonary contraindication to her planned surgical procedure

## 2024-08-16 NOTE — REVIEW OF SYSTEMS
[GERD] : gerd [Thyroid Problem] : thyroid problem [Fatigue] : no fatigue [Dry Mouth] : no dry mouth [Cough] : no cough [Hemoptysis] : no hemoptysis [Sputum] : no sputum [Myalgias] : no myalgias [Chest Discomfort] : no chest discomfort [Rash] : no rash [Headache] : no headache [Anxiety] : no anxiety [Diabetes] : no diabetes

## 2025-09-02 ENCOUNTER — APPOINTMENT (OUTPATIENT)
Dept: UROGYNECOLOGY | Facility: CLINIC | Age: 45
End: 2025-09-02
Payer: COMMERCIAL

## 2025-09-02 VITALS
BODY MASS INDEX: 27.56 KG/M2 | HEART RATE: 68 BPM | HEIGHT: 61 IN | WEIGHT: 146 LBS | SYSTOLIC BLOOD PRESSURE: 124 MMHG | DIASTOLIC BLOOD PRESSURE: 79 MMHG

## 2025-09-02 DIAGNOSIS — N36.42 INTRINSIC SPHINCTER DEFICIENCY (ISD): ICD-10-CM

## 2025-09-02 DIAGNOSIS — N39.3 STRESS INCONTINENCE (FEMALE) (MALE): ICD-10-CM

## 2025-09-02 DIAGNOSIS — R39.15 URGENCY OF URINATION: ICD-10-CM

## 2025-09-02 DIAGNOSIS — R35.0 FREQUENCY OF MICTURITION: ICD-10-CM

## 2025-09-02 LAB
BILIRUB UR QL STRIP: NORMAL
CLARITY UR: CLEAR
COLLECTION METHOD: NORMAL
GLUCOSE UR-MCNC: NORMAL
HCG UR QL: 0.2 EU/DL
HGB UR QL STRIP.AUTO: NORMAL
KETONES UR-MCNC: NORMAL
LEUKOCYTE ESTERASE UR QL STRIP: NORMAL
NITRITE UR QL STRIP: NORMAL
PH UR STRIP: 5.5
PROT UR STRIP-MCNC: NORMAL
SP GR UR STRIP: 1.03

## 2025-09-02 PROCEDURE — 51701 INSERT BLADDER CATHETER: CPT

## 2025-09-02 PROCEDURE — 99204 OFFICE O/P NEW MOD 45 MIN: CPT | Mod: 25

## 2025-09-02 PROCEDURE — 99459 PELVIC EXAMINATION: CPT

## 2025-09-02 PROCEDURE — 81003 URINALYSIS AUTO W/O SCOPE: CPT | Mod: QW

## 2025-09-03 DIAGNOSIS — Z81.8 FAMILY HISTORY OF OTHER MENTAL AND BEHAVIORAL DISORDERS: ICD-10-CM

## 2025-09-03 DIAGNOSIS — Z80.3 FAMILY HISTORY OF MALIGNANT NEOPLASM OF BREAST: ICD-10-CM

## 2025-09-04 ENCOUNTER — NON-APPOINTMENT (OUTPATIENT)
Age: 45
End: 2025-09-04

## 2025-09-04 LAB — BACTERIA UR CULT: NORMAL
